# Patient Record
Sex: MALE | Race: WHITE | NOT HISPANIC OR LATINO | Employment: OTHER | URBAN - METROPOLITAN AREA
[De-identification: names, ages, dates, MRNs, and addresses within clinical notes are randomized per-mention and may not be internally consistent; named-entity substitution may affect disease eponyms.]

---

## 2017-08-05 ENCOUNTER — COMMUNICATION - HEALTHEAST (OUTPATIENT)
Dept: FAMILY MEDICINE | Facility: CLINIC | Age: 72
End: 2017-08-05

## 2017-08-05 DIAGNOSIS — I10 ESSENTIAL HYPERTENSION, BENIGN: ICD-10-CM

## 2017-08-07 ENCOUNTER — COMMUNICATION - HEALTHEAST (OUTPATIENT)
Dept: FAMILY MEDICINE | Facility: CLINIC | Age: 72
End: 2017-08-07

## 2017-08-11 ENCOUNTER — OFFICE VISIT - HEALTHEAST (OUTPATIENT)
Dept: FAMILY MEDICINE | Facility: CLINIC | Age: 72
End: 2017-08-11

## 2017-08-11 DIAGNOSIS — E11.9 DIABETES MELLITUS TYPE 2, CONTROLLED (H): ICD-10-CM

## 2017-08-11 DIAGNOSIS — B35.3 TINEA PEDIS: ICD-10-CM

## 2017-08-11 DIAGNOSIS — Z00.00 ROUTINE GENERAL MEDICAL EXAMINATION AT A HEALTH CARE FACILITY: ICD-10-CM

## 2017-08-11 DIAGNOSIS — E55.9 VITAMIN D DEFICIENCY: ICD-10-CM

## 2017-08-11 DIAGNOSIS — E78.00 HYPERCHOLESTEREMIA: ICD-10-CM

## 2017-08-11 LAB
CHOLEST SERPL-MCNC: 131 MG/DL
FASTING STATUS PATIENT QL REPORTED: YES
HBA1C MFR BLD: 7.6 % (ref 3.5–6)
HDLC SERPL-MCNC: 42 MG/DL
LDLC SERPL CALC-MCNC: 73 MG/DL
TRIGL SERPL-MCNC: 79 MG/DL

## 2017-08-11 ASSESSMENT — MIFFLIN-ST. JEOR: SCORE: 1444.07

## 2017-08-15 ENCOUNTER — COMMUNICATION - HEALTHEAST (OUTPATIENT)
Dept: FAMILY MEDICINE | Facility: CLINIC | Age: 72
End: 2017-08-15

## 2017-09-12 ENCOUNTER — COMMUNICATION - HEALTHEAST (OUTPATIENT)
Dept: FAMILY MEDICINE | Facility: CLINIC | Age: 72
End: 2017-09-12

## 2017-09-12 DIAGNOSIS — E11.9 TYPE 2 DIABETES MELLITUS (H): ICD-10-CM

## 2017-09-13 ENCOUNTER — RECORDS - HEALTHEAST (OUTPATIENT)
Dept: ADMINISTRATIVE | Facility: OTHER | Age: 72
End: 2017-09-13

## 2017-09-21 ENCOUNTER — RECORDS - HEALTHEAST (OUTPATIENT)
Dept: ADMINISTRATIVE | Facility: OTHER | Age: 72
End: 2017-09-21

## 2017-10-12 ENCOUNTER — COMMUNICATION - HEALTHEAST (OUTPATIENT)
Dept: FAMILY MEDICINE | Facility: CLINIC | Age: 72
End: 2017-10-12

## 2017-10-21 ENCOUNTER — COMMUNICATION - HEALTHEAST (OUTPATIENT)
Dept: FAMILY MEDICINE | Facility: CLINIC | Age: 72
End: 2017-10-21

## 2017-10-21 DIAGNOSIS — B35.3 TINEA PEDIS: ICD-10-CM

## 2017-10-21 DIAGNOSIS — I10 ESSENTIAL HYPERTENSION, BENIGN: ICD-10-CM

## 2017-10-23 ENCOUNTER — COMMUNICATION - HEALTHEAST (OUTPATIENT)
Dept: FAMILY MEDICINE | Facility: CLINIC | Age: 72
End: 2017-10-23

## 2017-12-30 ENCOUNTER — COMMUNICATION - HEALTHEAST (OUTPATIENT)
Dept: FAMILY MEDICINE | Facility: CLINIC | Age: 72
End: 2017-12-30

## 2017-12-30 DIAGNOSIS — I10 ESSENTIAL HYPERTENSION, BENIGN: ICD-10-CM

## 2018-03-31 ENCOUNTER — COMMUNICATION - HEALTHEAST (OUTPATIENT)
Dept: FAMILY MEDICINE | Facility: CLINIC | Age: 73
End: 2018-03-31

## 2018-03-31 DIAGNOSIS — E11.9 TYPE 2 DIABETES MELLITUS (H): ICD-10-CM

## 2018-05-03 ENCOUNTER — RECORDS - HEALTHEAST (OUTPATIENT)
Dept: ADMINISTRATIVE | Facility: OTHER | Age: 73
End: 2018-05-03

## 2018-05-08 ENCOUNTER — COMMUNICATION - HEALTHEAST (OUTPATIENT)
Dept: FAMILY MEDICINE | Facility: CLINIC | Age: 73
End: 2018-05-08

## 2018-05-08 DIAGNOSIS — E11.9 TYPE 2 DIABETES MELLITUS (H): ICD-10-CM

## 2018-05-08 DIAGNOSIS — I10 ESSENTIAL HYPERTENSION, BENIGN: ICD-10-CM

## 2018-08-04 ENCOUNTER — COMMUNICATION - HEALTHEAST (OUTPATIENT)
Dept: FAMILY MEDICINE | Facility: CLINIC | Age: 73
End: 2018-08-04

## 2018-08-04 DIAGNOSIS — B35.3 TINEA PEDIS: ICD-10-CM

## 2018-08-06 ENCOUNTER — COMMUNICATION - HEALTHEAST (OUTPATIENT)
Dept: FAMILY MEDICINE | Facility: CLINIC | Age: 73
End: 2018-08-06

## 2018-08-06 DIAGNOSIS — I10 ESSENTIAL HYPERTENSION, BENIGN: ICD-10-CM

## 2018-08-13 ENCOUNTER — AMBULATORY - HEALTHEAST (OUTPATIENT)
Dept: FAMILY MEDICINE | Facility: CLINIC | Age: 73
End: 2018-08-13

## 2018-08-13 ENCOUNTER — OFFICE VISIT - HEALTHEAST (OUTPATIENT)
Dept: FAMILY MEDICINE | Facility: CLINIC | Age: 73
End: 2018-08-13

## 2018-08-13 DIAGNOSIS — D12.6 BENIGN NEOPLASM OF COLON: ICD-10-CM

## 2018-08-13 DIAGNOSIS — E78.00 HYPERCHOLESTEREMIA: ICD-10-CM

## 2018-08-13 DIAGNOSIS — E11.9 DIABETES MELLITUS TYPE 2, CONTROLLED (H): ICD-10-CM

## 2018-08-13 DIAGNOSIS — B35.3 TINEA PEDIS OF BOTH FEET: ICD-10-CM

## 2018-08-13 DIAGNOSIS — E55.9 VITAMIN D DEFICIENCY: ICD-10-CM

## 2018-08-13 LAB
ALBUMIN SERPL-MCNC: 4.3 G/DL (ref 3.5–5)
ALP SERPL-CCNC: 53 U/L (ref 45–120)
ALT SERPL W P-5'-P-CCNC: 24 U/L (ref 0–45)
ANION GAP SERPL CALCULATED.3IONS-SCNC: 13 MMOL/L (ref 5–18)
AST SERPL W P-5'-P-CCNC: 21 U/L (ref 0–40)
BILIRUB SERPL-MCNC: 1.2 MG/DL (ref 0–1)
BUN SERPL-MCNC: 15 MG/DL (ref 8–28)
CALCIUM SERPL-MCNC: 9.8 MG/DL (ref 8.5–10.5)
CHLORIDE BLD-SCNC: 104 MMOL/L (ref 98–107)
CHOLEST SERPL-MCNC: 131 MG/DL
CO2 SERPL-SCNC: 25 MMOL/L (ref 22–31)
CREAT SERPL-MCNC: 0.97 MG/DL (ref 0.7–1.3)
CREAT UR-MCNC: 96.7 MG/DL
FASTING STATUS PATIENT QL REPORTED: YES
GFR SERPL CREATININE-BSD FRML MDRD: >60 ML/MIN/1.73M2
GLUCOSE BLD-MCNC: 96 MG/DL (ref 70–125)
HBA1C MFR BLD: 7.2 % (ref 3.5–6)
HDLC SERPL-MCNC: 42 MG/DL
LDLC SERPL CALC-MCNC: 77 MG/DL
MICROALBUMIN UR-MCNC: 0.79 MG/DL (ref 0–1.99)
MICROALBUMIN/CREAT UR: 8.2 MG/G
POTASSIUM BLD-SCNC: 4.6 MMOL/L (ref 3.5–5)
PROT SERPL-MCNC: 6.7 G/DL (ref 6–8)
SODIUM SERPL-SCNC: 142 MMOL/L (ref 136–145)
TRIGL SERPL-MCNC: 59 MG/DL

## 2018-08-13 ASSESSMENT — MIFFLIN-ST. JEOR: SCORE: 1425.82

## 2018-08-14 ENCOUNTER — COMMUNICATION - HEALTHEAST (OUTPATIENT)
Dept: FAMILY MEDICINE | Facility: CLINIC | Age: 73
End: 2018-08-14

## 2018-08-14 LAB — 25(OH)D3 SERPL-MCNC: 50.8 NG/ML (ref 30–80)

## 2018-09-23 ENCOUNTER — COMMUNICATION - HEALTHEAST (OUTPATIENT)
Dept: FAMILY MEDICINE | Facility: CLINIC | Age: 73
End: 2018-09-23

## 2018-09-23 DIAGNOSIS — B35.3 TINEA PEDIS: ICD-10-CM

## 2018-09-24 ENCOUNTER — COMMUNICATION - HEALTHEAST (OUTPATIENT)
Dept: FAMILY MEDICINE | Facility: CLINIC | Age: 73
End: 2018-09-24

## 2018-09-24 DIAGNOSIS — E11.9 TYPE 2 DIABETES MELLITUS (H): ICD-10-CM

## 2018-09-24 RX ORDER — ERGOCALCIFEROL 1.25 MG/1
CAPSULE ORAL
Qty: 6 CAPSULE | Refills: 3 | Status: SHIPPED | OUTPATIENT
Start: 2018-09-24 | End: 2022-04-26

## 2018-09-26 ENCOUNTER — RECORDS - HEALTHEAST (OUTPATIENT)
Dept: ADMINISTRATIVE | Facility: OTHER | Age: 73
End: 2018-09-26

## 2018-10-04 ENCOUNTER — RECORDS - HEALTHEAST (OUTPATIENT)
Dept: ADMINISTRATIVE | Facility: OTHER | Age: 73
End: 2018-10-04

## 2018-10-23 ENCOUNTER — COMMUNICATION - HEALTHEAST (OUTPATIENT)
Dept: FAMILY MEDICINE | Facility: CLINIC | Age: 73
End: 2018-10-23

## 2018-10-23 DIAGNOSIS — I10 ESSENTIAL HYPERTENSION, BENIGN: ICD-10-CM

## 2018-11-02 ENCOUNTER — RECORDS - HEALTHEAST (OUTPATIENT)
Dept: ADMINISTRATIVE | Facility: OTHER | Age: 73
End: 2018-11-02

## 2018-12-14 ENCOUNTER — COMMUNICATION - HEALTHEAST (OUTPATIENT)
Dept: FAMILY MEDICINE | Facility: CLINIC | Age: 73
End: 2018-12-14

## 2018-12-14 DIAGNOSIS — I10 ESSENTIAL HYPERTENSION, BENIGN: ICD-10-CM

## 2019-04-24 ENCOUNTER — COMMUNICATION - HEALTHEAST (OUTPATIENT)
Dept: FAMILY MEDICINE | Facility: CLINIC | Age: 74
End: 2019-04-24

## 2019-04-24 DIAGNOSIS — I10 ESSENTIAL HYPERTENSION, BENIGN: ICD-10-CM

## 2019-04-24 DIAGNOSIS — E11.9 TYPE 2 DIABETES MELLITUS (H): ICD-10-CM

## 2019-05-23 ENCOUNTER — RECORDS - HEALTHEAST (OUTPATIENT)
Dept: ADMINISTRATIVE | Facility: OTHER | Age: 74
End: 2019-05-23

## 2019-05-28 ENCOUNTER — RECORDS - HEALTHEAST (OUTPATIENT)
Dept: HEALTH INFORMATION MANAGEMENT | Facility: CLINIC | Age: 74
End: 2019-05-28

## 2019-07-30 ENCOUNTER — COMMUNICATION - HEALTHEAST (OUTPATIENT)
Dept: FAMILY MEDICINE | Facility: CLINIC | Age: 74
End: 2019-07-30

## 2019-07-30 DIAGNOSIS — I10 ESSENTIAL HYPERTENSION, BENIGN: ICD-10-CM

## 2019-07-31 ENCOUNTER — COMMUNICATION - HEALTHEAST (OUTPATIENT)
Dept: FAMILY MEDICINE | Facility: CLINIC | Age: 74
End: 2019-07-31

## 2019-07-31 DIAGNOSIS — I10 ESSENTIAL HYPERTENSION, BENIGN: ICD-10-CM

## 2019-09-04 ENCOUNTER — OFFICE VISIT - HEALTHEAST (OUTPATIENT)
Dept: FAMILY MEDICINE | Facility: CLINIC | Age: 74
End: 2019-09-04

## 2019-09-04 DIAGNOSIS — Z00.00 ROUTINE GENERAL MEDICAL EXAMINATION AT A HEALTH CARE FACILITY: ICD-10-CM

## 2019-09-04 DIAGNOSIS — E78.00 HYPERCHOLESTEREMIA: ICD-10-CM

## 2019-09-04 DIAGNOSIS — D12.6 ADENOMATOUS POLYP OF COLON, UNSPECIFIED PART OF COLON: ICD-10-CM

## 2019-09-04 DIAGNOSIS — I10 ESSENTIAL HYPERTENSION, BENIGN: ICD-10-CM

## 2019-09-04 DIAGNOSIS — B35.3 TINEA PEDIS OF BOTH FEET: ICD-10-CM

## 2019-09-04 DIAGNOSIS — R97.20 ELEVATED PROSTATE SPECIFIC ANTIGEN (PSA): ICD-10-CM

## 2019-09-04 DIAGNOSIS — E11.9 CONTROLLED TYPE 2 DIABETES MELLITUS WITHOUT COMPLICATION, WITHOUT LONG-TERM CURRENT USE OF INSULIN (H): ICD-10-CM

## 2019-09-04 DIAGNOSIS — E55.9 VITAMIN D DEFICIENCY: ICD-10-CM

## 2019-09-04 LAB
ALBUMIN SERPL-MCNC: 4.5 G/DL (ref 3.5–5)
ALP SERPL-CCNC: 58 U/L (ref 45–120)
ALT SERPL W P-5'-P-CCNC: 20 U/L (ref 0–45)
ANION GAP SERPL CALCULATED.3IONS-SCNC: 10 MMOL/L (ref 5–18)
AST SERPL W P-5'-P-CCNC: 18 U/L (ref 0–40)
BILIRUB SERPL-MCNC: 1.6 MG/DL (ref 0–1)
BUN SERPL-MCNC: 11 MG/DL (ref 8–28)
CALCIUM SERPL-MCNC: 9.7 MG/DL (ref 8.5–10.5)
CHLORIDE BLD-SCNC: 103 MMOL/L (ref 98–107)
CHOLEST SERPL-MCNC: 147 MG/DL
CO2 SERPL-SCNC: 28 MMOL/L (ref 22–31)
CREAT SERPL-MCNC: 0.97 MG/DL (ref 0.7–1.3)
CREAT UR-MCNC: 93.9 MG/DL
FASTING STATUS PATIENT QL REPORTED: YES
GFR SERPL CREATININE-BSD FRML MDRD: >60 ML/MIN/1.73M2
GLUCOSE BLD-MCNC: 149 MG/DL (ref 70–125)
HBA1C MFR BLD: 7.2 % (ref 3.5–6)
HDLC SERPL-MCNC: 52 MG/DL
LDLC SERPL CALC-MCNC: 76 MG/DL
MAGNESIUM SERPL-MCNC: 2.1 MG/DL (ref 1.8–2.6)
MICROALBUMIN UR-MCNC: 1.17 MG/DL (ref 0–1.99)
MICROALBUMIN/CREAT UR: 12.5 MG/G
POTASSIUM BLD-SCNC: 4.5 MMOL/L (ref 3.5–5)
PROT SERPL-MCNC: 7 G/DL (ref 6–8)
SODIUM SERPL-SCNC: 141 MMOL/L (ref 136–145)
TRIGL SERPL-MCNC: 93 MG/DL

## 2019-09-04 ASSESSMENT — MIFFLIN-ST. JEOR: SCORE: 1426.39

## 2019-09-05 LAB
25(OH)D3 SERPL-MCNC: 38.9 NG/ML (ref 30–80)
25(OH)D3 SERPL-MCNC: 38.9 NG/ML (ref 30–80)

## 2019-09-24 ENCOUNTER — COMMUNICATION - HEALTHEAST (OUTPATIENT)
Dept: FAMILY MEDICINE | Facility: CLINIC | Age: 74
End: 2019-09-24

## 2019-09-24 DIAGNOSIS — E11.9 TYPE 2 DIABETES MELLITUS (H): ICD-10-CM

## 2019-09-27 ENCOUNTER — COMMUNICATION - HEALTHEAST (OUTPATIENT)
Dept: FAMILY MEDICINE | Facility: CLINIC | Age: 74
End: 2019-09-27

## 2019-09-27 DIAGNOSIS — I10 ESSENTIAL HYPERTENSION, BENIGN: ICD-10-CM

## 2019-10-07 ENCOUNTER — COMMUNICATION - HEALTHEAST (OUTPATIENT)
Dept: FAMILY MEDICINE | Facility: CLINIC | Age: 74
End: 2019-10-07

## 2019-10-07 DIAGNOSIS — E55.9 VITAMIN D DEFICIENCY: ICD-10-CM

## 2019-10-08 RX ORDER — ERGOCALCIFEROL 1.25 MG/1
CAPSULE ORAL
Qty: 6 CAPSULE | Refills: 3 | Status: SHIPPED | OUTPATIENT
Start: 2019-10-08 | End: 2022-04-26

## 2019-10-24 ENCOUNTER — COMMUNICATION - HEALTHEAST (OUTPATIENT)
Dept: FAMILY MEDICINE | Facility: CLINIC | Age: 74
End: 2019-10-24

## 2019-10-24 DIAGNOSIS — I10 ESSENTIAL HYPERTENSION, BENIGN: ICD-10-CM

## 2019-11-01 ENCOUNTER — AMBULATORY - HEALTHEAST (OUTPATIENT)
Dept: OTHER | Facility: CLINIC | Age: 74
End: 2019-11-01

## 2019-11-15 ENCOUNTER — AMBULATORY - HEALTHEAST (OUTPATIENT)
Dept: OTHER | Facility: CLINIC | Age: 74
End: 2019-11-15

## 2019-11-21 ENCOUNTER — RECORDS - HEALTHEAST (OUTPATIENT)
Dept: ADMINISTRATIVE | Facility: OTHER | Age: 74
End: 2019-11-21

## 2020-03-22 ENCOUNTER — COMMUNICATION - HEALTHEAST (OUTPATIENT)
Dept: SCHEDULING | Facility: CLINIC | Age: 75
End: 2020-03-22

## 2020-06-03 ENCOUNTER — COMMUNICATION - HEALTHEAST (OUTPATIENT)
Dept: FAMILY MEDICINE | Facility: CLINIC | Age: 75
End: 2020-06-03

## 2020-06-03 DIAGNOSIS — B35.3 TINEA PEDIS: ICD-10-CM

## 2020-06-04 RX ORDER — CLOTRIMAZOLE AND BETAMETHASONE DIPROPIONATE 10; .64 MG/G; MG/G
CREAM TOPICAL
Qty: 30 G | Refills: 0 | Status: SHIPPED | OUTPATIENT
Start: 2020-06-04 | End: 2023-03-31

## 2020-06-15 ENCOUNTER — COMMUNICATION - HEALTHEAST (OUTPATIENT)
Dept: FAMILY MEDICINE | Facility: CLINIC | Age: 75
End: 2020-06-15

## 2020-06-15 DIAGNOSIS — E11.9 TYPE 2 DIABETES MELLITUS (H): ICD-10-CM

## 2020-07-22 ENCOUNTER — OFFICE VISIT - HEALTHEAST (OUTPATIENT)
Dept: FAMILY MEDICINE | Facility: CLINIC | Age: 75
End: 2020-07-22

## 2020-07-22 DIAGNOSIS — E11.9 CONTROLLED TYPE 2 DIABETES MELLITUS WITHOUT COMPLICATION, WITHOUT LONG-TERM CURRENT USE OF INSULIN (H): ICD-10-CM

## 2020-07-22 DIAGNOSIS — R10.13 EPIGASTRIC PAIN: ICD-10-CM

## 2020-07-22 DIAGNOSIS — E55.9 VITAMIN D DEFICIENCY: ICD-10-CM

## 2020-07-27 ENCOUNTER — AMBULATORY - HEALTHEAST (OUTPATIENT)
Dept: LAB | Facility: CLINIC | Age: 75
End: 2020-07-27

## 2020-07-27 DIAGNOSIS — E11.9 CONTROLLED TYPE 2 DIABETES MELLITUS WITHOUT COMPLICATION, WITHOUT LONG-TERM CURRENT USE OF INSULIN (H): ICD-10-CM

## 2020-07-27 DIAGNOSIS — E55.9 VITAMIN D DEFICIENCY: ICD-10-CM

## 2020-07-27 DIAGNOSIS — R10.13 EPIGASTRIC PAIN: ICD-10-CM

## 2020-07-27 LAB
ALBUMIN SERPL-MCNC: 4.4 G/DL (ref 3.5–5)
ALP SERPL-CCNC: 52 U/L (ref 45–120)
ALT SERPL W P-5'-P-CCNC: 24 U/L (ref 0–45)
ANION GAP SERPL CALCULATED.3IONS-SCNC: 9 MMOL/L (ref 5–18)
AST SERPL W P-5'-P-CCNC: 18 U/L (ref 0–40)
BASOPHILS # BLD AUTO: 0 THOU/UL (ref 0–0.2)
BASOPHILS NFR BLD AUTO: 1 % (ref 0–2)
BILIRUB SERPL-MCNC: 1.3 MG/DL (ref 0–1)
BUN SERPL-MCNC: 13 MG/DL (ref 8–28)
CALCIUM SERPL-MCNC: 9.6 MG/DL (ref 8.5–10.5)
CHLORIDE BLD-SCNC: 102 MMOL/L (ref 98–107)
CHOLEST SERPL-MCNC: 144 MG/DL
CO2 SERPL-SCNC: 28 MMOL/L (ref 22–31)
CREAT SERPL-MCNC: 0.98 MG/DL (ref 0.7–1.3)
CREAT UR-MCNC: 139.8 MG/DL
EOSINOPHIL # BLD AUTO: 0.6 THOU/UL (ref 0–0.4)
EOSINOPHIL NFR BLD AUTO: 9 % (ref 0–6)
ERYTHROCYTE [DISTWIDTH] IN BLOOD BY AUTOMATED COUNT: 11.7 % (ref 11–14.5)
FASTING STATUS PATIENT QL REPORTED: YES
GFR SERPL CREATININE-BSD FRML MDRD: >60 ML/MIN/1.73M2
GLUCOSE BLD-MCNC: 144 MG/DL (ref 70–125)
HBA1C MFR BLD: 6.4 % (ref 3.5–6)
HCT VFR BLD AUTO: 41.2 % (ref 40–54)
HDLC SERPL-MCNC: 53 MG/DL
HGB BLD-MCNC: 14.1 G/DL (ref 14–18)
LDLC SERPL CALC-MCNC: 73 MG/DL
LIPASE SERPL-CCNC: 32 U/L (ref 0–52)
LYMPHOCYTES # BLD AUTO: 1.4 THOU/UL (ref 0.8–4.4)
LYMPHOCYTES NFR BLD AUTO: 20 % (ref 20–40)
MCH RBC QN AUTO: 31.8 PG (ref 27–34)
MCHC RBC AUTO-ENTMCNC: 34.1 G/DL (ref 32–36)
MCV RBC AUTO: 93 FL (ref 80–100)
MICROALBUMIN UR-MCNC: 1.24 MG/DL (ref 0–1.99)
MICROALBUMIN/CREAT UR: 8.9 MG/G
MONOCYTES # BLD AUTO: 0.5 THOU/UL (ref 0–0.9)
MONOCYTES NFR BLD AUTO: 7 % (ref 2–10)
NEUTROPHILS # BLD AUTO: 4.4 THOU/UL (ref 2–7.7)
NEUTROPHILS NFR BLD AUTO: 65 % (ref 50–70)
PLATELET # BLD AUTO: 166 THOU/UL (ref 140–440)
PMV BLD AUTO: 8.2 FL (ref 7–10)
POTASSIUM BLD-SCNC: 4.5 MMOL/L (ref 3.5–5)
PROT SERPL-MCNC: 6.6 G/DL (ref 6–8)
RBC # BLD AUTO: 4.42 MILL/UL (ref 4.4–6.2)
SODIUM SERPL-SCNC: 139 MMOL/L (ref 136–145)
TRIGL SERPL-MCNC: 88 MG/DL
WBC: 6.9 THOU/UL (ref 4–11)

## 2020-07-28 ENCOUNTER — COMMUNICATION - HEALTHEAST (OUTPATIENT)
Dept: FAMILY MEDICINE | Facility: CLINIC | Age: 75
End: 2020-07-28

## 2020-07-28 LAB — 25(OH)D3 SERPL-MCNC: 51.1 NG/ML (ref 30–80)

## 2020-07-29 ENCOUNTER — AMBULATORY - HEALTHEAST (OUTPATIENT)
Dept: FAMILY MEDICINE | Facility: CLINIC | Age: 75
End: 2020-07-29

## 2020-07-29 DIAGNOSIS — D72.10 EOSINOPHILIA: ICD-10-CM

## 2020-08-26 ENCOUNTER — COMMUNICATION - HEALTHEAST (OUTPATIENT)
Dept: LAB | Facility: CLINIC | Age: 75
End: 2020-08-26

## 2020-08-26 DIAGNOSIS — D72.10 EOSINOPHILIA: ICD-10-CM

## 2020-09-05 ENCOUNTER — COMMUNICATION - HEALTHEAST (OUTPATIENT)
Dept: FAMILY MEDICINE | Facility: CLINIC | Age: 75
End: 2020-09-05

## 2020-09-05 DIAGNOSIS — E55.9 VITAMIN D DEFICIENCY: ICD-10-CM

## 2020-09-06 RX ORDER — ERGOCALCIFEROL 1.25 MG/1
CAPSULE ORAL
Qty: 6 CAPSULE | Refills: 3 | Status: SHIPPED | OUTPATIENT
Start: 2020-09-06 | End: 2023-03-31

## 2020-09-09 ENCOUNTER — COMMUNICATION - HEALTHEAST (OUTPATIENT)
Dept: FAMILY MEDICINE | Facility: CLINIC | Age: 75
End: 2020-09-09

## 2020-09-09 DIAGNOSIS — I10 ESSENTIAL HYPERTENSION, BENIGN: ICD-10-CM

## 2020-09-10 RX ORDER — AMLODIPINE BESYLATE 2.5 MG/1
TABLET ORAL
Qty: 90 TABLET | Refills: 3 | Status: SHIPPED | OUTPATIENT
Start: 2020-09-10 | End: 2021-09-10

## 2020-09-10 RX ORDER — BENAZEPRIL HYDROCHLORIDE 10 MG/1
TABLET ORAL
Qty: 90 TABLET | Refills: 3 | Status: SHIPPED | OUTPATIENT
Start: 2020-09-10 | End: 2021-09-13

## 2020-09-21 ENCOUNTER — OFFICE VISIT - HEALTHEAST (OUTPATIENT)
Dept: FAMILY MEDICINE | Facility: CLINIC | Age: 75
End: 2020-09-21

## 2020-09-21 DIAGNOSIS — H60.503 ACUTE OTITIS EXTERNA OF BOTH EARS, UNSPECIFIED TYPE: ICD-10-CM

## 2020-09-21 DIAGNOSIS — Z23 NEED FOR INFLUENZA VACCINATION: ICD-10-CM

## 2020-09-21 DIAGNOSIS — H61.23 BILATERAL IMPACTED CERUMEN: ICD-10-CM

## 2020-09-22 ENCOUNTER — AMBULATORY - HEALTHEAST (OUTPATIENT)
Dept: FAMILY MEDICINE | Facility: CLINIC | Age: 75
End: 2020-09-22

## 2020-09-22 DIAGNOSIS — H90.3 BILATERAL SENSORINEURAL HEARING LOSS: ICD-10-CM

## 2020-09-23 ENCOUNTER — AMBULATORY - HEALTHEAST (OUTPATIENT)
Dept: LAB | Facility: CLINIC | Age: 75
End: 2020-09-23

## 2020-09-23 DIAGNOSIS — D72.10 EOSINOPHILIA: ICD-10-CM

## 2020-09-23 LAB
BASOPHILS # BLD AUTO: 0 THOU/UL (ref 0–0.2)
BASOPHILS NFR BLD AUTO: 1 % (ref 0–2)
EOSINOPHIL # BLD AUTO: 0.5 THOU/UL (ref 0–0.4)
EOSINOPHIL NFR BLD AUTO: 7 % (ref 0–6)
ERYTHROCYTE [DISTWIDTH] IN BLOOD BY AUTOMATED COUNT: 11.5 % (ref 11–14.5)
HCT VFR BLD AUTO: 41.4 % (ref 40–54)
HGB BLD-MCNC: 14 G/DL (ref 14–18)
LYMPHOCYTES # BLD AUTO: 1.5 THOU/UL (ref 0.8–4.4)
LYMPHOCYTES NFR BLD AUTO: 22 % (ref 20–40)
MCH RBC QN AUTO: 31.1 PG (ref 27–34)
MCHC RBC AUTO-ENTMCNC: 33.7 G/DL (ref 32–36)
MCV RBC AUTO: 92 FL (ref 80–100)
MONOCYTES # BLD AUTO: 0.6 THOU/UL (ref 0–0.9)
MONOCYTES NFR BLD AUTO: 8 % (ref 2–10)
NEUTROPHILS # BLD AUTO: 4.4 THOU/UL (ref 2–7.7)
NEUTROPHILS NFR BLD AUTO: 63 % (ref 50–70)
PLATELET # BLD AUTO: 222 THOU/UL (ref 140–440)
PMV BLD AUTO: 8.2 FL (ref 7–10)
RBC # BLD AUTO: 4.48 MILL/UL (ref 4.4–6.2)
WBC: 7 THOU/UL (ref 4–11)

## 2020-09-25 ENCOUNTER — COMMUNICATION - HEALTHEAST (OUTPATIENT)
Dept: FAMILY MEDICINE | Facility: CLINIC | Age: 75
End: 2020-09-25

## 2020-09-25 ENCOUNTER — COMMUNICATION - HEALTHEAST (OUTPATIENT)
Dept: SCHEDULING | Facility: CLINIC | Age: 75
End: 2020-09-25

## 2020-09-25 DIAGNOSIS — H60.503 ACUTE OTITIS EXTERNA OF BOTH EARS, UNSPECIFIED TYPE: ICD-10-CM

## 2020-10-01 ENCOUNTER — OFFICE VISIT - HEALTHEAST (OUTPATIENT)
Dept: AUDIOLOGY | Facility: CLINIC | Age: 75
End: 2020-10-01

## 2020-10-01 ENCOUNTER — OFFICE VISIT - HEALTHEAST (OUTPATIENT)
Dept: OTOLARYNGOLOGY | Facility: CLINIC | Age: 75
End: 2020-10-01

## 2020-10-01 DIAGNOSIS — H60.393 INFECTIVE OTITIS EXTERNA, BILATERAL: ICD-10-CM

## 2020-10-01 DIAGNOSIS — H90.3 SENSORINEURAL HEARING LOSS (SNHL) OF BOTH EARS: ICD-10-CM

## 2020-10-01 DIAGNOSIS — H61.23 BILATERAL IMPACTED CERUMEN: ICD-10-CM

## 2020-10-01 DIAGNOSIS — H93.13 TINNITUS OF BOTH EARS: ICD-10-CM

## 2020-10-07 ENCOUNTER — COMMUNICATION - HEALTHEAST (OUTPATIENT)
Dept: OTOLARYNGOLOGY | Facility: CLINIC | Age: 75
End: 2020-10-07

## 2020-10-07 DIAGNOSIS — H60.503 ACUTE OTITIS EXTERNA OF BOTH EARS, UNSPECIFIED TYPE: ICD-10-CM

## 2020-10-07 RX ORDER — CIPROFLOXACIN AND DEXAMETHASONE 3; 1 MG/ML; MG/ML
SUSPENSION/ DROPS AURICULAR (OTIC)
Qty: 7.5 ML | Refills: 0 | Status: SHIPPED | OUTPATIENT
Start: 2020-10-07 | End: 2022-04-26

## 2020-10-09 ENCOUNTER — OFFICE VISIT - HEALTHEAST (OUTPATIENT)
Dept: OTOLARYNGOLOGY | Facility: CLINIC | Age: 75
End: 2020-10-09

## 2020-10-09 DIAGNOSIS — L29.9 ITCHING OF EAR: ICD-10-CM

## 2020-10-09 DIAGNOSIS — H60.393 INFECTIVE OTITIS EXTERNA, BILATERAL: ICD-10-CM

## 2020-10-09 RX ORDER — FLUOCINONIDE 0.5 MG/G
CREAM TOPICAL
Qty: 30 G | Refills: 1 | Status: SHIPPED | OUTPATIENT
Start: 2020-10-09 | End: 2023-03-31

## 2020-12-07 ENCOUNTER — COMMUNICATION - HEALTHEAST (OUTPATIENT)
Dept: FAMILY MEDICINE | Facility: CLINIC | Age: 75
End: 2020-12-07

## 2020-12-07 DIAGNOSIS — E11.9 TYPE 2 DIABETES MELLITUS (H): ICD-10-CM

## 2020-12-16 ENCOUNTER — COMMUNICATION - HEALTHEAST (OUTPATIENT)
Dept: FAMILY MEDICINE | Facility: CLINIC | Age: 75
End: 2020-12-16

## 2020-12-17 ENCOUNTER — COMMUNICATION - HEALTHEAST (OUTPATIENT)
Dept: FAMILY MEDICINE | Facility: CLINIC | Age: 75
End: 2020-12-17

## 2020-12-17 DIAGNOSIS — I10 ESSENTIAL HYPERTENSION, BENIGN: ICD-10-CM

## 2020-12-18 RX ORDER — ATORVASTATIN CALCIUM 10 MG/1
TABLET, FILM COATED ORAL
Qty: 90 TABLET | Refills: 2 | Status: SHIPPED | OUTPATIENT
Start: 2020-12-18 | End: 2021-09-10

## 2021-01-14 ENCOUNTER — COMMUNICATION - HEALTHEAST (OUTPATIENT)
Dept: FAMILY MEDICINE | Facility: CLINIC | Age: 76
End: 2021-01-14

## 2021-02-18 ENCOUNTER — RECORDS - HEALTHEAST (OUTPATIENT)
Dept: ADMINISTRATIVE | Facility: OTHER | Age: 76
End: 2021-02-18

## 2021-02-22 ENCOUNTER — COMMUNICATION - HEALTHEAST (OUTPATIENT)
Dept: FAMILY MEDICINE | Facility: CLINIC | Age: 76
End: 2021-02-22

## 2021-03-05 ENCOUNTER — COMMUNICATION - HEALTHEAST (OUTPATIENT)
Dept: FAMILY MEDICINE | Facility: CLINIC | Age: 76
End: 2021-03-05

## 2021-03-05 DIAGNOSIS — E11.9 TYPE 2 DIABETES MELLITUS (H): ICD-10-CM

## 2021-03-12 ENCOUNTER — RECORDS - HEALTHEAST (OUTPATIENT)
Dept: ADMINISTRATIVE | Facility: OTHER | Age: 76
End: 2021-03-12

## 2021-03-12 LAB — RETINOPATHY: NEGATIVE

## 2021-03-19 ENCOUNTER — RECORDS - HEALTHEAST (OUTPATIENT)
Dept: HEALTH INFORMATION MANAGEMENT | Facility: CLINIC | Age: 76
End: 2021-03-19

## 2021-03-22 ENCOUNTER — OFFICE VISIT - HEALTHEAST (OUTPATIENT)
Dept: FAMILY MEDICINE | Facility: CLINIC | Age: 76
End: 2021-03-22

## 2021-03-22 DIAGNOSIS — Z00.00 ROUTINE GENERAL MEDICAL EXAMINATION AT A HEALTH CARE FACILITY: ICD-10-CM

## 2021-03-22 DIAGNOSIS — Z11.3 SCREEN FOR STD (SEXUALLY TRANSMITTED DISEASE): ICD-10-CM

## 2021-03-22 DIAGNOSIS — E11.9 CONTROLLED TYPE 2 DIABETES MELLITUS WITHOUT COMPLICATION, WITHOUT LONG-TERM CURRENT USE OF INSULIN (H): ICD-10-CM

## 2021-03-22 DIAGNOSIS — E55.9 VITAMIN D DEFICIENCY: ICD-10-CM

## 2021-03-22 LAB
ALBUMIN SERPL-MCNC: 4.5 G/DL (ref 3.5–5)
ALP SERPL-CCNC: 73 U/L (ref 45–120)
ALT SERPL W P-5'-P-CCNC: 25 U/L (ref 0–45)
ANION GAP SERPL CALCULATED.3IONS-SCNC: 12 MMOL/L (ref 5–18)
AST SERPL W P-5'-P-CCNC: 18 U/L (ref 0–40)
BASOPHILS # BLD AUTO: 0 THOU/UL (ref 0–0.2)
BASOPHILS NFR BLD AUTO: 1 % (ref 0–2)
BILIRUB SERPL-MCNC: 2 MG/DL (ref 0–1)
BUN SERPL-MCNC: 14 MG/DL (ref 8–28)
CALCIUM SERPL-MCNC: 9.5 MG/DL (ref 8.5–10.5)
CHLORIDE BLD-SCNC: 104 MMOL/L (ref 98–107)
CHOLEST SERPL-MCNC: 144 MG/DL
CO2 SERPL-SCNC: 26 MMOL/L (ref 22–31)
CREAT SERPL-MCNC: 0.93 MG/DL (ref 0.7–1.3)
CREAT UR-MCNC: 71.6 MG/DL
EOSINOPHIL # BLD AUTO: 0.4 THOU/UL (ref 0–0.4)
EOSINOPHIL NFR BLD AUTO: 6 % (ref 0–6)
ERYTHROCYTE [DISTWIDTH] IN BLOOD BY AUTOMATED COUNT: 12.5 % (ref 11–14.5)
FASTING STATUS PATIENT QL REPORTED: YES
GFR SERPL CREATININE-BSD FRML MDRD: >60 ML/MIN/1.73M2
GLUCOSE BLD-MCNC: 143 MG/DL (ref 70–125)
HBA1C MFR BLD: 6.9 %
HCT VFR BLD AUTO: 42.3 % (ref 40–54)
HDLC SERPL-MCNC: 48 MG/DL
HGB BLD-MCNC: 14.5 G/DL (ref 14–18)
IMM GRANULOCYTES # BLD: 0 THOU/UL
IMM GRANULOCYTES NFR BLD: 0 %
LDLC SERPL CALC-MCNC: 75 MG/DL
LYMPHOCYTES # BLD AUTO: 1.1 THOU/UL (ref 0.8–4.4)
LYMPHOCYTES NFR BLD AUTO: 16 % (ref 20–40)
MCH RBC QN AUTO: 30.5 PG (ref 27–34)
MCHC RBC AUTO-ENTMCNC: 34.3 G/DL (ref 32–36)
MCV RBC AUTO: 89 FL (ref 80–100)
MICROALBUMIN UR-MCNC: 0.59 MG/DL (ref 0–1.99)
MICROALBUMIN/CREAT UR: 8.2 MG/G
MONOCYTES # BLD AUTO: 0.7 THOU/UL (ref 0–0.9)
MONOCYTES NFR BLD AUTO: 10 % (ref 2–10)
NEUTROPHILS # BLD AUTO: 4.7 THOU/UL (ref 2–7.7)
NEUTROPHILS NFR BLD AUTO: 67 % (ref 50–70)
PLATELET # BLD AUTO: 180 THOU/UL (ref 140–440)
PMV BLD AUTO: 10.3 FL (ref 7–10)
POTASSIUM BLD-SCNC: 4.7 MMOL/L (ref 3.5–5)
PROT SERPL-MCNC: 7 G/DL (ref 6–8)
RBC # BLD AUTO: 4.75 MILL/UL (ref 4.4–6.2)
SODIUM SERPL-SCNC: 142 MMOL/L (ref 136–145)
TRIGL SERPL-MCNC: 103 MG/DL
WBC: 7 THOU/UL (ref 4–11)

## 2021-03-22 ASSESSMENT — MIFFLIN-ST. JEOR: SCORE: 1401.72

## 2021-03-23 ENCOUNTER — COMMUNICATION - HEALTHEAST (OUTPATIENT)
Dept: FAMILY MEDICINE | Facility: CLINIC | Age: 76
End: 2021-03-23

## 2021-03-23 LAB
25(OH)D3 SERPL-MCNC: 50.2 NG/ML (ref 30–80)
HCV AB SERPL QL IA: NEGATIVE

## 2021-04-28 ENCOUNTER — RECORDS - HEALTHEAST (OUTPATIENT)
Dept: ADMINISTRATIVE | Facility: OTHER | Age: 76
End: 2021-04-28

## 2021-05-23 ENCOUNTER — COMMUNICATION - HEALTHEAST (OUTPATIENT)
Dept: FAMILY MEDICINE | Facility: CLINIC | Age: 76
End: 2021-05-23

## 2021-05-23 DIAGNOSIS — E11.9 TYPE 2 DIABETES MELLITUS (H): ICD-10-CM

## 2021-05-24 RX ORDER — GLIMEPIRIDE 2 MG/1
TABLET ORAL
Qty: 180 TABLET | Refills: 3 | Status: SHIPPED | OUTPATIENT
Start: 2021-05-24 | End: 2022-05-08

## 2021-05-28 NOTE — TELEPHONE ENCOUNTER
Refill Approved    Rx renewed per Medication Renewal Policy. Medication was last renewed on 1/1/18.    Krystle Dougherty, [facility] Triage/Med Refill 4/26/2019     Requested Prescriptions   Pending Prescriptions Disp Refills     glimepiride (AMARYL) 2 MG tablet [Pharmacy Med Name: GLIMEPIRIDE 2MG TABLETS] 180 tablet 0     Sig: TAKE 2 TABLETS(4 MG) BY MOUTH DAILY       Oral Hypoglycemics Refill Protocol Failed - 4/24/2019 10:42 AM        Failed - Visit with PCP or prescribing provider visit in last 6 months       Last office visit with prescriber/PCP: Visit date not found OR same dept: 8/13/2018 Joana Mckeon MD OR same specialty: 8/13/2018 Joana Mckeon MD Last physical: Visit date not found Last MTM visit: Visit date not found         Next appt within 3 mo: Visit date not found  Next physical within 3 mo: Visit date not found  Prescriber OR PCP: Joana Mckeon MD  Last diagnosis associated with med order: 1. Type 2 diabetes mellitus (H)  - glimepiride (AMARYL) 2 MG tablet [Pharmacy Med Name: GLIMEPIRIDE 2MG TABLETS]; TAKE 2 TABLETS(4 MG) BY MOUTH DAILY  Dispense: 180 tablet; Refill: 0    2. Essential hypertension, benign  - atorvastatin (LIPITOR) 10 MG tablet [Pharmacy Med Name: ATORVASTATIN 10MG TABLETS]; TAKE 1 TABLET(10 MG) BY MOUTH DAILY  Dispense: 90 tablet; Refill: 0     If protocol passes may refill for 12 months if within 3 months of last provider visit (or a total of 15 months).           Failed - A1C in last 6 months     Hemoglobin A1c   Date Value Ref Range Status   08/13/2018 7.2 (H) 3.5 - 6.0 % Final               Passed - Microalbumin in last year      Microalbumin, Random Urine   Date Value Ref Range Status   08/13/2018 0.79 0.00 - 1.99 mg/dL Final                  Passed - Blood pressure in last year     BP Readings from Last 1 Encounters:   08/13/18 128/60             Passed - Serum creatinine in last year     Creatinine   Date Value Ref Range Status    08/13/2018 0.97 0.70 - 1.30 mg/dL Final             atorvastatin (LIPITOR) 10 MG tablet [Pharmacy Med Name: ATORVASTATIN 10MG TABLETS] 90 tablet 0     Sig: TAKE 1 TABLET(10 MG) BY MOUTH DAILY       Statins Refill Protocol (Hmg CoA Reductase Inhibitors) Passed - 4/24/2019 10:42 AM        Passed - PCP or prescribing provider visit in past 12 months      Last office visit with prescriber/PCP: 8/13/2018 Joana Mckeon MD OR same dept: 8/13/2018 Joana Mckeon MD OR same specialty: 8/13/2018 Joana Mckeon MD  Last physical: 8/11/2017 Last MTM visit: Visit date not found   Next visit within 3 mo: Visit date not found  Next physical within 3 mo: Visit date not found  Prescriber OR PCP: Joana Mckeon MD  Last diagnosis associated with med order: 1. Type 2 diabetes mellitus (H)  - glimepiride (AMARYL) 2 MG tablet [Pharmacy Med Name: GLIMEPIRIDE 2MG TABLETS]; TAKE 2 TABLETS(4 MG) BY MOUTH DAILY  Dispense: 180 tablet; Refill: 0    2. Essential hypertension, benign  - atorvastatin (LIPITOR) 10 MG tablet [Pharmacy Med Name: ATORVASTATIN 10MG TABLETS]; TAKE 1 TABLET(10 MG) BY MOUTH DAILY  Dispense: 90 tablet; Refill: 0    If protocol passes may refill for 12 months if within 3 months of last provider visit (or a total of 15 months).

## 2021-05-28 NOTE — TELEPHONE ENCOUNTER
RN cannot approve Refill Request    RN can NOT refill this medication Protocol failed and NO refill given.       Krystle Dougherty, [facility] Triage/Med Refill 4/26/2019    Requested Prescriptions   Pending Prescriptions Disp Refills     glimepiride (AMARYL) 2 MG tablet [Pharmacy Med Name: GLIMEPIRIDE 2MG TABLETS] 180 tablet 0     Sig: TAKE 2 TABLETS(4 MG) BY MOUTH DAILY       Oral Hypoglycemics Refill Protocol Failed - 4/24/2019 10:42 AM        Failed - Visit with PCP or prescribing provider visit in last 6 months       Last office visit with prescriber/PCP: Visit date not found OR same dept: 8/13/2018 Joana Mckeon MD OR same specialty: 8/13/2018 Joana Mckeon MD Last physical: Visit date not found Last MTM visit: Visit date not found         Next appt within 3 mo: Visit date not found  Next physical within 3 mo: Visit date not found  Prescriber OR PCP: Joana Mckeon MD  Last diagnosis associated with med order: 1. Type 2 diabetes mellitus (H)  - glimepiride (AMARYL) 2 MG tablet [Pharmacy Med Name: GLIMEPIRIDE 2MG TABLETS]; TAKE 2 TABLETS(4 MG) BY MOUTH DAILY  Dispense: 180 tablet; Refill: 0    2. Essential hypertension, benign  - atorvastatin (LIPITOR) 10 MG tablet; TAKE 1 TABLET(10 MG) BY MOUTH DAILY  Dispense: 90 tablet; Refill: 0     If protocol passes may refill for 12 months if within 3 months of last provider visit (or a total of 15 months).           Failed - A1C in last 6 months     Hemoglobin A1c   Date Value Ref Range Status   08/13/2018 7.2 (H) 3.5 - 6.0 % Final               Passed - Microalbumin in last year      Microalbumin, Random Urine   Date Value Ref Range Status   08/13/2018 0.79 0.00 - 1.99 mg/dL Final                  Passed - Blood pressure in last year     BP Readings from Last 1 Encounters:   08/13/18 128/60             Passed - Serum creatinine in last year     Creatinine   Date Value Ref Range Status   08/13/2018 0.97 0.70 - 1.30 mg/dL Final            Signed Prescriptions Disp Refills    atorvastatin (LIPITOR) 10 MG tablet 90 tablet 0     Sig: TAKE 1 TABLET(10 MG) BY MOUTH DAILY       Statins Refill Protocol (Hmg CoA Reductase Inhibitors) Passed - 4/24/2019 10:42 AM        Passed - PCP or prescribing provider visit in past 12 months      Last office visit with prescriber/PCP: 8/13/2018 oJana Mckeon MD OR same dept: 8/13/2018 Joana Mckeon MD OR same specialty: 8/13/2018 Joana Mckeon MD  Last physical: 8/11/2017 Last MTM visit: Visit date not found   Next visit within 3 mo: Visit date not found  Next physical within 3 mo: Visit date not found  Prescriber OR PCP: Joana Mckeon MD  Last diagnosis associated with med order: 1. Type 2 diabetes mellitus (H)  - glimepiride (AMARYL) 2 MG tablet [Pharmacy Med Name: GLIMEPIRIDE 2MG TABLETS]; TAKE 2 TABLETS(4 MG) BY MOUTH DAILY  Dispense: 180 tablet; Refill: 0    2. Essential hypertension, benign  - atorvastatin (LIPITOR) 10 MG tablet; TAKE 1 TABLET(10 MG) BY MOUTH DAILY  Dispense: 90 tablet; Refill: 0    If protocol passes may refill for 12 months if within 3 months of last provider visit (or a total of 15 months).

## 2021-05-28 NOTE — TELEPHONE ENCOUNTER
Refill Approved    Rx renewed per Medication Renewal Policy. Medication was last renewed on 12/14/18.    Krystle Dougherty, [facility] Connection Triage/Med Refill 4/26/2019     Requested Prescriptions   Pending Prescriptions Disp Refills     amLODIPine (NORVASC) 2.5 MG tablet [Pharmacy Med Name: AMLODIPINE BESYLATE 2.5MG TABLETS] 90 tablet 0     Sig: TAKE 1 TABLET BY MOUTH EVERY DAY       Calcium-Channel Blockers Protocol Passed - 4/24/2019 10:43 AM        Passed - PCP or prescribing provider visit in past 12 months or next 3 months     Last office visit with prescriber/PCP: Visit date not found OR same dept: 8/13/2018 Joana Mckeon MD OR same specialty: 8/13/2018 Joana Mckeon MD  Last physical: Visit date not found Last MTM visit: Visit date not found   Next visit within 3 mo: Visit date not found  Next physical within 3 mo: Visit date not found  Prescriber OR PCP: Amrit Juares MD  Last diagnosis associated with med order: 1. Essential hypertension, benign  - amLODIPine (NORVASC) 2.5 MG tablet [Pharmacy Med Name: AMLODIPINE BESYLATE 2.5MG TABLETS]; TAKE 1 TABLET BY MOUTH EVERY DAY  Dispense: 90 tablet; Refill: 0    If protocol passes may refill for 12 months if within 3 months of last provider visit (or a total of 15 months).             Passed - Blood pressure filed in past 12 months     BP Readings from Last 1 Encounters:   08/13/18 128/60

## 2021-05-30 NOTE — TELEPHONE ENCOUNTER
Due to be seen    Rx renewed per Medication Renewal Policy. Medication was last renewed on 4/26/19.    Krystle Dougherty, [facility] Triage/Med Refill 7/30/2019     Requested Prescriptions   Pending Prescriptions Disp Refills     amLODIPine (NORVASC) 2.5 MG tablet [Pharmacy Med Name: AMLODIPINE BESYLATE 2.5MG TABLETS] 90 tablet 0     Sig: TAKE 1 TABLET BY MOUTH EVERY DAY       Calcium-Channel Blockers Protocol Passed - 7/30/2019 12:26 PM        Passed - PCP or prescribing provider visit in past 12 months or next 3 months     Last office visit with prescriber/PCP: Visit date not found OR same dept: 8/13/2018 Joana Mckeon MD OR same specialty: 8/13/2018 Joana Mckeon MD  Last physical: Visit date not found Last MTM visit: Visit date not found   Next visit within 3 mo: Visit date not found  Next physical within 3 mo: Visit date not found  Prescriber OR PCP: Amrit Juares MD  Last diagnosis associated with med order: 1. Essential hypertension, benign  - amLODIPine (NORVASC) 2.5 MG tablet [Pharmacy Med Name: AMLODIPINE BESYLATE 2.5MG TABLETS]; TAKE 1 TABLET BY MOUTH EVERY DAY  Dispense: 90 tablet; Refill: 0    If protocol passes may refill for 12 months if within 3 months of last provider visit (or a total of 15 months).             Passed - Blood pressure filed in past 12 months     BP Readings from Last 1 Encounters:   08/13/18 128/60

## 2021-05-31 VITALS — HEIGHT: 70 IN | BODY MASS INDEX: 22.65 KG/M2 | WEIGHT: 158.25 LBS

## 2021-06-01 VITALS — BODY MASS INDEX: 22.05 KG/M2 | HEIGHT: 70 IN | WEIGHT: 154 LBS

## 2021-06-01 NOTE — TELEPHONE ENCOUNTER
Refill Approved    Rx renewed per Medication Renewal Policy. Medication was last renewed on 4/26/19.    Esperanza Mora, [facility] Triage/Med Refill 9/27/2019     Requested Prescriptions   Pending Prescriptions Disp Refills     atorvastatin (LIPITOR) 10 MG tablet [Pharmacy Med Name: ATORVASTATIN 10MG TABLETS] 90 tablet 0     Sig: TAKE 1 TABLET(10 MG) BY MOUTH DAILY       Statins Refill Protocol (Hmg CoA Reductase Inhibitors) Passed - 9/27/2019  3:17 AM        Passed - PCP or prescribing provider visit in past 12 months      Last office visit with prescriber/PCP: 8/13/2018 Joana Mckeon MD OR same dept: Visit date not found OR same specialty: 8/13/2018 Joana Mckeon MD  Last physical: 9/4/2019 Last MTM visit: Visit date not found   Next visit within 3 mo: Visit date not found  Next physical within 3 mo: Visit date not found  Prescriber OR PCP: Joana Mckeon MD  Last diagnosis associated with med order: 1. Essential hypertension, benign  - atorvastatin (LIPITOR) 10 MG tablet [Pharmacy Med Name: ATORVASTATIN 10MG TABLETS]; TAKE 1 TABLET(10 MG) BY MOUTH DAILY  Dispense: 90 tablet; Refill: 0    If protocol passes may refill for 12 months if within 3 months of last provider visit (or a total of 15 months).

## 2021-06-01 NOTE — PROGRESS NOTES
Assessment and Plan:       Chinedu was seen today for annual wellness visit.    Routine general medical examination at a health care facility  He will get Shingrix/ flu shots at pharmacy.  Will fill out HC directive.  Discussed pros/ cons to stopping colon and prostate cancer screening.  He will consider and discuss with urology.  Colonoscopy not due til 2023.    Vitamin D deficiency  -     Vitamin D, Total (25-Hydroxy)    Controlled type 2 diabetes mellitus without complication, without long-term current use of insulin (H)- Controlled.  -     Microalbumin, Random Urine  -     Lipid Cascade  -     Comprehensive Metabolic Panel  -     Glycosylated Hemoglobin A1c  -     Magnesium    Benign Essential Hypertension-Controlled    Hypercholesteremia- Controlled        The patient's current medical problems were reviewed.    I have had an Advance Directives discussion with the patient.  The following health maintenance schedule was reviewed with the patient and provided in printed form in the after visit summary:   Health Maintenance   Topic Date Due     DIABETES FOOT EXAM  08/16/1955     ZOSTER VACCINES (1 of 2) 08/16/1993     ADVANCE CARE PLANNING  07/24/2017     DIABETES FOLLOW-UP  02/11/2018     MEDICARE ANNUAL WELLNESS VISIT  08/11/2018     DIABETES HEMOGLOBIN A1C  02/13/2019     INFLUENZA VACCINE RULE BASED (1) 08/01/2019     DIABETES URINE MICROALBUMIN  08/13/2019     FALL RISK ASSESSMENT  08/13/2019     DIABETES OPHTHALMOLOGY EXAM  05/23/2020     TD 18+ HE  10/27/2020     PNEUMOCOCCAL POLYSACCHARIDE VACCINE AGE 65 AND OVER  Completed     PNEUMOCOCCAL CONJUGATE VACCINE FOR ADULTS (PCV13 OR PREVNAR)  Completed        Subjective:   Chief Complaint: Chinedu Cifuentes is an 74 y.o. male here for an Annual Wellness visit.   HPI:  1) diabetes mellitus type 2 - Blood sugar readings 103/ 107.  Walks 1-2 hours / day. Taking Amaryl 4 mg daily. Had eye exam with no retinopathy this year.  No numbness/tingling or lesions in  lower extremities.  2) Hypercholesterolemia - Taking statin with CoQ10.  No myalgias.  3) HTN - Taking amlodipine/ benazepril for BP control.  4) Colonoscopy- Had 2 adenomatous polyps in 2018.  5 year recheck recommended.  5) Sees urology yearly for PSA checks.    Review of Systems:    Please see above.  The rest of the review of systems are negative for all systems.    SH: .  Retired professor.  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  Travels a lot- going to [facility]/ [place] / [facility] this fall.    Patient Care Team:  Joana Mckeon MD as PCP - General  Joana Mckeon MD as Assigned PCP     Patient Active Problem List   Diagnosis     Benign Essential Hypertension     Diabetes mellitus type 2, controlled (H)     Adenomatous polyp of colon, unspecified part of colon     Vitamin D Deficiency     Hypercholesteremia     Actinic Keratosis     Serology Prostate-specific Antigen (PSA) Elevated     Tinea pedis of both feet     No past medical history on file.   No past surgical history on file.   Family History   Problem Relation Age of Onset     Hyperlipidemia Mother      Lung disease Mother      Depression Mother      Diabetes type II Father      Hypertension Father      Hyperlipidemia Father      Diabetes type II Sister      Brain cancer Sister      Diabetes type II Brother      Hyperlipidemia Brother      No Medical Problems Maternal Grandmother      No Medical Problems Maternal Grandfather      Dementia Paternal Grandmother      Colon cancer Paternal Grandfather 97     No Medical Problems Daughter      No Medical Problems Daughter      No Medical Problems Daughter       Social History     Socioeconomic History     Marital status:      Spouse name: Not on file     Number of children: Not on file     Years of education: Not on file     Highest education level: Not on file   Occupational History     Not on file   Social Needs     Financial resource strain: Not on file     Food insecurity:     Worry: Not  on file     Inability: Not on file     Transportation needs:     Medical: Not on file     Non-medical: Not on file   Tobacco Use     Smoking status: Never Smoker     Smokeless tobacco: Never Used   Substance and Sexual Activity     Alcohol use: Yes     Alcohol/week: 4.2 oz     Types: 7 Glasses of wine per week     Drug use: No     Sexual activity: Not on file   Lifestyle     Physical activity:     Days per week: Not on file     Minutes per session: Not on file     Stress: Not on file   Relationships     Social connections:     Talks on phone: Not on file     Gets together: Not on file     Attends Tenriism service: Not on file     Active member of club or organization: Not on file     Attends meetings of clubs or organizations: Not on file     Relationship status: Not on file     Intimate partner violence:     Fear of current or ex partner: Not on file     Emotionally abused: Not on file     Physically abused: Not on file     Forced sexual activity: Not on file   Other Topics Concern     Not on file   Social History Narrative     Not on file      Current Outpatient Medications   Medication Sig Dispense Refill     amLODIPine (NORVASC) 2.5 MG tablet TAKE 1 TABLET BY MOUTH EVERY DAY 90 tablet 0     atorvastatin (LIPITOR) 10 MG tablet TAKE 1 TABLET(10 MG) BY MOUTH DAILY 90 tablet 0     benazepril (LOTENSIN) 10 MG tablet Take 1 tablet (10 mg total) by mouth daily. 90 tablet 3     blood glucose meter (GLUCOMETER) Dispense glucometer brand per patient's insurance at pharmacy discretion. 1 each 0     blood glucose test strips Use 1 each As Directed as needed. Dispense brand per patient's insurance at pharmacy discretion. 100 each 11     CALCIUM CITRATE/VITAMIN D3 (CITRACAL REGULAR ORAL) Take 1 tablet by mouth daily.        generic lancets Use 1 each As Directed as needed. Dispense brand per patient's insurance at pharmacy discretion. 100 each 11     glimepiride (AMARYL) 2 MG tablet TAKE 2 TABLETS(4 MG) BY MOUTH DAILY 180  "tablet 0     UBIDECARENONE (COQ-10 ORAL) Take 1 tablet by mouth every other day.        aspirin 81 mg TbEF Take 1 tablet by mouth daily.       clotrimazole-betamethasone (LOTRISONE) cream APPLY AND RUB THIN LAYER EXTERNALLY TO THE AFFECTED AREA EVERY MORNING AND EVERY EVENING 30 g 0     ergocalciferol (ERGOCALCIFEROL) 50,000 unit capsule TAKE ONE CAPSULE BY MOUTH EVERY 2 WEEKS 6 capsule 3     No current facility-administered medications for this visit.       Objective:   Vital Signs:   Visit Vitals  /60 (Patient Site: Right Arm, Patient Position: Sitting, Cuff Size: Adult Regular)   Pulse (!) 51   Ht 5' 9.5\" (1.765 m)   Wt 155 lb (70.3 kg)   SpO2 100%   BMI 22.56 kg/m         VisionScreening:  No exam data present     PHYSICAL EXAM  /60 (Patient Site: Right Arm, Patient Position: Sitting, Cuff Size: Adult Regular)   Pulse (!) 51   Ht 5' 9.5\" (1.765 m)   Wt 155 lb (70.3 kg)   SpO2 100%   BMI 22.56 kg/m    No acute distress  HEENT: Head atraumatic / normocephalic.  PERRL.  Conjunctiva clear. Nose with no discharge.  Tympanic membranes grey with normal landmarks.  OP - pink and moist.  Normal dentition.  Neck: Supple.  No lymphadenopathy or thyromegaly.  Lungs: CTA.  No retractions or tachypnea.  CV: RRR. S1 and S2 normal.  No murmurs / rubs / gallops.  No lower extremity edema.    Abdomen: Soft. Non tender. Non distended.  No HSM or masses.  Skin: No rashes or lesions.  Neuro: AAOx3.  Normal strength and tone.  Normal gait.  DTRs in lower extremities equal bilaterally.  Psych: Mood and affect normal.  Good eye contact.  Normal speech.     Assessment Results 9/4/2019   Activities of Daily Living No help needed   Instrumental Activities of Daily Living No help needed   Mini Cog Total Score 5   Some recent data might be hidden     A Mini-Cog score of 0-2 suggests the possibility of dementia, score of 3-5 suggests no dementia    Identified Health Risks:     The patient was provided with written " information regarding signs of hearing loss.  Patient's advanced directive was discussed and I am comfortable with the patient's wishes.      Joana Mckeon

## 2021-06-01 NOTE — TELEPHONE ENCOUNTER
Refill Approved    Rx renewed per Medication Renewal Policy. Medication was last renewed on 4/26/19.    Krystle Dougherty, [facility] Connection Triage/Med Refill 9/25/2019     Requested Prescriptions   Pending Prescriptions Disp Refills     glimepiride (AMARYL) 2 MG tablet [Pharmacy Med Name: GLIMEPIRIDE 2MG TABLETS] 180 tablet 0     Sig: TAKE 2 TABLETS(4 MG) BY MOUTH DAILY       Oral Hypoglycemics Refill Protocol Passed - 9/24/2019  2:41 PM        Passed - Visit with PCP or prescribing provider visit in last 6 months       Last office visit with prescriber/PCP: Visit date not found OR same dept: Visit date not found OR same specialty: 8/13/2018 Joana Mckeon MD Last physical: 9/4/2019 Last MTM visit: Visit date not found         Next appt within 3 mo: Visit date not found  Next physical within 3 mo: Visit date not found  Prescriber OR PCP: Joana Mckeon MD  Last diagnosis associated with med order: 1. Type 2 diabetes mellitus (H)  - glimepiride (AMARYL) 2 MG tablet [Pharmacy Med Name: GLIMEPIRIDE 2MG TABLETS]; TAKE 2 TABLETS(4 MG) BY MOUTH DAILY  Dispense: 180 tablet; Refill: 0     If protocol passes may refill for 12 months if within 3 months of last provider visit (or a total of 15 months).           Passed - A1C in last 6 months     Hemoglobin A1c   Date Value Ref Range Status   09/04/2019 7.2 (H) 3.5 - 6.0 % Final               Passed - Microalbumin in last year      Microalbumin, Random Urine   Date Value Ref Range Status   09/04/2019 1.17 0.00 - 1.99 mg/dL Final                  Passed - Blood pressure in last year     BP Readings from Last 1 Encounters:   09/04/19 136/60             Passed - Serum creatinine in last year     Creatinine   Date Value Ref Range Status   09/04/2019 0.97 0.70 - 1.30 mg/dL Final

## 2021-06-02 NOTE — TELEPHONE ENCOUNTER
RN cannot approve Refill Request    RN can NOT refill this medication med is not covered by policy/route to provider     . Last office visit: 8/13/2018 Joana Mckeon MD Last Physical: 9/4/2019 Last MTM visit: Visit date not found Last visit same specialty: 8/13/2018 Joana Mckeon MD.  Next visit within 3 mo: Visit date not found  Next physical within 3 mo: Visit date not found      Krystle Dougherty, [facility] Triage/Med Refill 10/8/2019    Requested Prescriptions   Pending Prescriptions Disp Refills     ergocalciferol (ERGOCALCIFEROL) 50,000 unit capsule [Pharmacy Med Name: VITAMIN D2 50,000IU (ERGO) CAP RX] 6 capsule 0     Sig: TAKE ONE CAPSULE BY MOUTH EVERY 2 WEEKS       There is no refill protocol information for this order

## 2021-06-02 NOTE — TELEPHONE ENCOUNTER
Refill Approved    Rx renewed per Medication Renewal Policy. Medication was last renewed on 10/23/18 and 7/31/19.    Shanta Grey, [facility] Triage/Med Refill 10/24/2019     Requested Prescriptions   Pending Prescriptions Disp Refills     benazepril (LOTENSIN) 10 MG tablet [Pharmacy Med Name: BENAZEPRIL 10MG TABLETS] 90 tablet 0     Sig: TAKE 1 TABLET(10 MG) BY MOUTH DAILY       Ace Inhibitors Refill Protocol Passed - 10/24/2019  2:58 PM        Passed - PCP or prescribing provider visit in past 12 months       Last office visit with prescriber/PCP: 8/13/2018 Joana Mckeon MD OR same dept: Visit date not found OR same specialty: 8/13/2018 Joana Mckeon MD  Last physical: 9/4/2019 Last MTM visit: Visit date not found   Next visit within 3 mo: Visit date not found  Next physical within 3 mo: Visit date not found  Prescriber OR PCP: Joana Mckeon MD  Last diagnosis associated with med order: 1. Essential hypertension, benign  - benazepril (LOTENSIN) 10 MG tablet [Pharmacy Med Name: BENAZEPRIL 10MG TABLETS]; TAKE 1 TABLET(10 MG) BY MOUTH DAILY  Dispense: 90 tablet; Refill: 0  - amLODIPine (NORVASC) 2.5 MG tablet [Pharmacy Med Name: AMLODIPINE BESYLATE 2.5MG TABLETS]; TAKE 1 TABLET BY MOUTH EVERY DAY  Dispense: 90 tablet; Refill: 0    If protocol passes may refill for 12 months if within 3 months of last provider visit (or a total of 15 months).             Passed - Serum Potassium in past 12 months     Lab Results   Component Value Date    Potassium 4.5 09/04/2019             Passed - Blood pressure filed in past 12 months     BP Readings from Last 1 Encounters:   09/04/19 136/60             Passed - Serum Creatinine in past 12 months     Creatinine   Date Value Ref Range Status   09/04/2019 0.97 0.70 - 1.30 mg/dL Final             amLODIPine (NORVASC) 2.5 MG tablet [Pharmacy Med Name: AMLODIPINE BESYLATE 2.5MG TABLETS] 90 tablet 0     Sig: TAKE 1 TABLET BY MOUTH EVERY DAY        Calcium-Channel Blockers Protocol Passed - 10/24/2019  2:58 PM        Passed - PCP or prescribing provider visit in past 12 months or next 3 months     Last office visit with prescriber/PCP: 8/13/2018 Joana Mckeon MD OR same dept: Visit date not found OR same specialty: 8/13/2018 Joana Mckeon MD  Last physical: 9/4/2019 Last MTM visit: Visit date not found   Next visit within 3 mo: Visit date not found  Next physical within 3 mo: Visit date not found  Prescriber OR PCP: Joana Mckoen MD  Last diagnosis associated with med order: 1. Essential hypertension, benign  - benazepril (LOTENSIN) 10 MG tablet [Pharmacy Med Name: BENAZEPRIL 10MG TABLETS]; TAKE 1 TABLET(10 MG) BY MOUTH DAILY  Dispense: 90 tablet; Refill: 0  - amLODIPine (NORVASC) 2.5 MG tablet [Pharmacy Med Name: AMLODIPINE BESYLATE 2.5MG TABLETS]; TAKE 1 TABLET BY MOUTH EVERY DAY  Dispense: 90 tablet; Refill: 0    If protocol passes may refill for 12 months if within 3 months of last provider visit (or a total of 15 months).             Passed - Blood pressure filed in past 12 months     BP Readings from Last 1 Encounters:   09/04/19 136/60

## 2021-06-03 VITALS
DIASTOLIC BLOOD PRESSURE: 60 MMHG | OXYGEN SATURATION: 100 % | HEART RATE: 51 BPM | HEIGHT: 70 IN | WEIGHT: 155 LBS | SYSTOLIC BLOOD PRESSURE: 136 MMHG | BODY MASS INDEX: 22.19 KG/M2

## 2021-06-05 VITALS
OXYGEN SATURATION: 100 % | HEIGHT: 70 IN | SYSTOLIC BLOOD PRESSURE: 140 MMHG | DIASTOLIC BLOOD PRESSURE: 68 MMHG | HEART RATE: 56 BPM | WEIGHT: 149.56 LBS | RESPIRATION RATE: 16 BRPM | BODY MASS INDEX: 21.41 KG/M2 | TEMPERATURE: 97.7 F

## 2021-06-05 VITALS
RESPIRATION RATE: 20 BRPM | WEIGHT: 155 LBS | SYSTOLIC BLOOD PRESSURE: 134 MMHG | TEMPERATURE: 98.3 F | OXYGEN SATURATION: 100 % | HEART RATE: 64 BPM | DIASTOLIC BLOOD PRESSURE: 64 MMHG | BODY MASS INDEX: 22.56 KG/M2

## 2021-06-07 NOTE — TELEPHONE ENCOUNTER
"Wife calling - no consent on file.  Verbal consent given by patient over the phone.      Wife says patient has terrible pain in lower abdomen.  Pain is constant.  Patient rates pain as \"moderate\" but wife says he is moaning and groaning.  Is bent over walking around \"to walk it off.\"  Has had pain and has been vomiting non stop for 4 hours.  Has vomited 12-15 times.    Is diabetic last blood sugar was 140.    Triaged to disposition of Go to ED Now.  Wife says she will bring him to [facility] ED.  Advised [facility] ED staff patient is coming.    Patient does have a cough but wife and patient says he \"always has a cough\" nothing new.      Sidra Dennison RN  Triage Nurse Advisor      COVID 19 Nurse Triage Plan    Please be aware that novel coronavirus (COVID-19) may be circulating in the community. If you develop symptoms such as fever, cough, or SOB or if you have concerns about the presence of another infection including coronavirus (COVID-19), please contact your health care provider or visit [email or web address].     Patient HAS known exposure, fever, cough or SOB in addition to reason for call. Patient advised to go to ED.     Instructions Given to Patient  You need to be seen in the Emergency Department (ED). Leave now. Drive carefully. Follow these instructions.    You should go to the closest ED.    Another adult should drive you to the ED.    You or a care team member should call ahead to inform the ED of your symptoms and possible diagnosis of COVID-19 and get instructions about what entrance you should use to avoid going into the waiting area and risking infecting others.    Your mobile phone number should be given to whomever is referring you to the ED and entered into Epic so we can contact you.    Tell the first person you meet in the emergency department that you may have been exposed to COVID-19 so you can be directed to the appropriate entrance and not be in the general waiting room.    Regardless of if " you have been tested or not:  Patient who have symptoms (cough, fever, or shortness of breath), need to isolate for 7 days from when symptoms started OR 72 hours after fever resolves (without fever reducing medications) AND improvement of respiratory symptoms (whichever is longer).      Isolate yourself at home (in own room/own bathroom if possible)    Do Not allow any visitors    Do Not go to work or school    Do Not go to [employer],  centers, shopping, or other public places.    Do Not shake hands.    Avoid close and intimate contact with others (hugging, kissing).    Follow CDC recommendations for household cleaning of frequently touched services.     After the initial 7 days, continue to isolate yourself from household members as much as possible. To continue decrease the risk of community spread and exposure, you and any members of your household should limit activities in public for 14 days after starting home isolation.     You can reference the following CDC link for helpful home isolation/care tips:  https://www.cdc.gov/coronavirus/2019-ncov/downloads/10Things.pdf    Protect Others:    Cover Your Mouth and Nose with a mask, disposable tissue or wash cloth to avoid spreading germs to others.    Wash your hands and face frequently with soap and water      Thank you for limiting contact with others, wearing a simple mask to cover your cough, practice good hand hygiene habits and accessing our virtual services where possible to limit the spread of this virus.    For more information about COVID19 and options for caring for yourself at home, please visit the CDC website at https://www.cdc.gov/coronavirus/2019-ncov/about/steps-when-sick.html  For more options for care at [facility], please visit our website at [email or web address]      Reason for Disposition    [1] SEVERE pain AND [2] age > 60    [1] SEVERE pain (e.g., excruciating) AND [2] present > 1 hour    [1]  MODERATE vomiting (e.g., 3 - 5 times/day) AND [2] age > 60    Protocols used: ABDOMINAL PAIN - MALE-A-AH, VOMITING-A-AH

## 2021-06-07 NOTE — TELEPHONE ENCOUNTER
Pt started vomiting today, pt called ambulance, they came and took his blood pressure, blood sugar 252. Daughter intends to give pt a Zofran.  Daughter will f/u with clinic if needed.    Janiya Kirby RN, MA  [facility] RN Triage Nurse Advisor    Reason for Disposition    [1] SEVERE vomiting (e.g., 6 or more times/day, vomits everything) BUT [2] hydrated    Protocols used: VOMITING-A-AH

## 2021-06-08 NOTE — TELEPHONE ENCOUNTER
RN cannot approve Refill Request    RN can NOT refill this medication med is not covered by policy/route to provider     . Last office visit: 8/13/2018 Joana Mckeon MD Last Physical: 9/4/2019 Last MTM visit: Visit date not found Last visit same specialty: 8/13/2018 Joana Mckeon MD.  Next visit within 3 mo: Visit date not found  Next physical within 3 mo: Visit date not found      Krystle Dougherty, [facility] Triage/Med Refill 6/4/2020    Requested Prescriptions   Pending Prescriptions Disp Refills     clotrimazole-betamethasone (LOTRISONE) cream 30 g 0     Sig: APPLY AND RUB THIN LAYER EXTERNALLY TO THE AFFECTED AREA EVERY MORNING AND EVERY EVENING       There is no refill protocol information for this order

## 2021-06-08 NOTE — TELEPHONE ENCOUNTER
Call pt - due for visit.  He can do a virtual annual wellness visit or just a visit for med check.

## 2021-06-08 NOTE — TELEPHONE ENCOUNTER
RN cannot approve Refill Request    RN can NOT refill this medication Protocol failed and NO refill given.       Krystle Dougherty, [facility] Triage/Med Refill 6/16/2020    Requested Prescriptions   Pending Prescriptions Disp Refills     glimepiride (AMARYL) 2 MG tablet [Pharmacy Med Name: GLIMEPIRIDE 2MG TABLETS] 180 tablet 3     Sig: TAKE 2 TABLETS(4 MG) BY MOUTH DAILY       Oral Hypoglycemics Refill Protocol Failed - 6/15/2020  3:48 AM        Failed - Visit with PCP or prescribing provider visit in last 6 months       Last office visit with prescriber/PCP: Visit date not found OR same dept: Visit date not found OR same specialty: 8/13/2018 Joana Mckeon MD Last physical: Visit date not found Last MTM visit: Visit date not found         Next appt within 3 mo: Visit date not found  Next physical within 3 mo: Visit date not found  Prescriber OR PCP: Joana Mckeon MD  Last diagnosis associated with med order: 1. Type 2 diabetes mellitus (H)  - glimepiride (AMARYL) 2 MG tablet [Pharmacy Med Name: GLIMEPIRIDE 2MG TABLETS]; TAKE 2 TABLETS(4 MG) BY MOUTH DAILY  Dispense: 180 tablet; Refill: 3     If protocol passes may refill for 12 months if within 3 months of last provider visit (or a total of 15 months).           Failed - A1C in last 6 months     Hemoglobin A1c   Date Value Ref Range Status   09/04/2019 7.2 (H) 3.5 - 6.0 % Final               Passed - Microalbumin in last year      Microalbumin, Random Urine   Date Value Ref Range Status   09/04/2019 1.17 0.00 - 1.99 mg/dL Final                  Passed - Blood pressure in last year     BP Readings from Last 1 Encounters:   09/04/19 136/60             Passed - Serum creatinine in last year     Creatinine   Date Value Ref Range Status   09/04/2019 0.97 0.70 - 1.30 mg/dL Final

## 2021-06-09 NOTE — PROGRESS NOTES
"Chinedu Cifuentes is a 74 y.o. male who is being evaluated via a billable telephone visit.      The patient has been notified of following:     \"This telephone visit will be conducted via a call between you and your physician/provider. We have found that certain health care needs can be provided without the need for a physical exam.  This service lets us provide the care you need with a short phone conversation.  If a prescription is necessary we can send it directly to your pharmacy.  If lab work is needed we can place an order for that and you can then stop by our lab to have the test done at a later time.    Telephone visits are billed at different rates depending on your insurance coverage. During this emergency period, for some insurers they may be billed the same as an in-person visit.  Please reach out to your insurance provider with any questions.    If during the course of the call the physician/provider feels a telephone visit is not appropriate, you will not be charged for this service.\"    Patient has given verbal consent to a Telephone visit? Yes    What phone number would you like to be contacted at? [phone]     Patient would like to receive their AVS by AVS Preference: Kyle.    Additional provider notes: no     SUBJECTIVE: Chinedu Cifuentes is a 74 y.o. male with:  Chief Complaint   Patient presents with     Follow-up     med check     Abdominal Pain     One episodes of severe pain x 3/17 like having Gallstone, but nothing since then.     1) diabetes mellitus type 2 - Blood sugars have been under 150.  No trouble with medicines.  Taking glimepiride.    2) Abdominal pain - He was eating March 17 then had nausea.  Vomited several times during the day.  Had pain in epigastric area.  No jaundice / fevers.  He did have chills and was shaking.  No diarrhea initially.  Later on had some loose stools.  He went to sleep and when he woke up the felt better.  911 was called and vitals were stable- blood " sugar was in upper 200s for a few days. He went on a liquid diet and pain improved.  He spoke with a physician friend and thought it could have been pancreatitis.  No one else in family is ill.  Has not had any other episodes of pain.  He is s/p choly.    3) HTN - Taking amlodipine / benazepril.  No side effects from medication.    4) Hypercholesterolemia - He continues on atorvastatin with no side effects    SH: .  Retired professor.  ROS:  No visual symptoms/ shortness of breath / chest pain.    OBJECTIVE: no distress    Lab Results   Component Value Date    HGBA1C 7.2 (H) 09/04/2019         Assessment/Plan:  1. Controlled type 2 diabetes mellitus without complication, without long-term current use of insulin (H)  He is due for lab work.  His diabetes mellitus appears to be well controlled.  Recommend yearly eye exam.  Will do face to face visit in future.  - Glycosylated Hemoglobin A1c; Future  - Lipid Cascade; Future  - Microalbumin, Random Urine; Future    2. Epigastric pain  Unclear cause of his pain- possibly attack pancreatitis/ gastroenteritis ?  Will check labs.  If recurrent pain, he should be seen and would order ultrasound.  - HM1(CBC and Differential); Future  - Comprehensive Metabolic Panel; Future  - Lipase; Future    3. Vitamin D deficiency  - Vitamin D, Total (25-Hydroxy); Future        Phone call duration:  17 minutes    LORIE Barrera

## 2021-06-10 NOTE — TELEPHONE ENCOUNTER
Patient has lab appt on 9/16/2020 for repeat blood count. Order was put in as same day; please change to future order.    Thanks

## 2021-06-11 NOTE — PROGRESS NOTES
CHIEF COMPLAINT:   Chief Complaint   Patient presents with     Cerumen Impaction     BL, greater on the left side. Audio after.     Hearing Loss     BL         HISTORY OF PRESENT ILLNESS    Chinedu was seen at the behest of Joana Mckeon for ear cleaning and hearing test.  Longstanding hearing loss.   No dizziness or vertigo.  Recently was treated for bilateral OE with ciprodex otic drops (finished 2 days ago).   Drops burned initially when he started using them.  Still some left ear discomfort.  No drainage at present.     Chief Complaint   Patient presents with     Cerumen Impaction     BL, greater on the left side. Audio after.     Hearing Loss     BL     9/21  visit:    Clinical Decision Making:  Suspect patient has both cerumen impaction and otitis externa.  Patient was started on Ciprodex today.  He was instructed to follow-up with audiology next week.  At the end of the encounter, I discussed results, diagnosis, medications. Discussed red flags for immediate return to clinic/ER, as well as indications for follow up if no improvement. Patient understood and agreed to plan. Patient was stable for discharge.     1. Acute otitis externa of both ears, unspecified type  ciprofloxacin-dexamethasone (CIPRODEX) otic suspension          REVIEW OF SYSTEMS    Review of Systems: a 10-system review is reviewed at this encounter.  See scanned document.     Patient has no known allergies.     PHYSICAL EXAM:        HEAD: Normal appearance and symmetry:  No cutaneous lesions.      EARS:    Right ear:  Some moist yellow disharge lateral EAC with ear hair and cerumen (removed using #5 suction. TM intact.   Left ear:   Soft cerumen impaction mixed with EAC hair, EAC is o/w clean and dry.  TM intact.          NOSE:    Dorsum:   straight  Septum:  Normal  Mucosa:  moist  Inferior turbinates:  2+       ORAL CAVITY/OROPHARYNX:    Lips:  Normal.  Tongue: normal, midline  Mucosa:   no lesions  Tonsils:  2+      NECK:   Trachea:  midline.   Thyroid:  normal   Adenopathy:  none       NEURO:   Alert and Oriented    GAIT AND STATION:  normal     RESPIRATORY:   Symmetry and Respiratory effort         IMPRESSION:    Encounter Diagnoses   Name Primary?     Infective otitis externa, bilateral Yes     Bilateral impacted cerumen      Sensorineural hearing loss (SNHL) of both ears           RECOMMENDATIONS:    Continue ciprodex drops in right ear for additional 5 days  Return every 6 months to have ears cleaned  Keep ears dry, no Qtip use.   Annual hearing test  Consider Hearing aids in future

## 2021-06-11 NOTE — TELEPHONE ENCOUNTER
Refill Approved    Rx renewed per Medication Renewal Policy. Medication was last renewed on vv 7/22/20.    Krystle Dougherty, [facility] Triage/Med Refill 9/10/2020     Requested Prescriptions   Pending Prescriptions Disp Refills     amLODIPine (NORVASC) 2.5 MG tablet [Pharmacy Med Name: AMLODIPINE BESYLATE 2.5MG TABLETS] 90 tablet 3     Sig: TAKE 1 TABLET BY MOUTH EVERY DAY       Calcium-Channel Blockers Protocol Failed - 9/9/2020  3:51 AM        Failed - Blood pressure filed in past 12 months     BP Readings from Last 1 Encounters:   09/04/19 136/60             Passed - PCP or prescribing provider visit in past 12 months or next 3 months     Last office visit with prescriber/PCP: 8/13/2018 Joana Mckeon MD OR same dept: Visit date not found OR same specialty: 8/13/2018 Joana Mckeno MD  Last physical: 9/4/2019 Last MTM visit: Visit date not found   Next visit within 3 mo: Visit date not found  Next physical within 3 mo: Visit date not found  Prescriber OR PCP: Joana Mckeon MD  Last diagnosis associated with med order: 1. Essential hypertension, benign  - amLODIPine (NORVASC) 2.5 MG tablet [Pharmacy Med Name: AMLODIPINE BESYLATE 2.5MG TABLETS]; TAKE 1 TABLET BY MOUTH EVERY DAY  Dispense: 90 tablet; Refill: 3  - benazepriL (LOTENSIN) 10 MG tablet [Pharmacy Med Name: BENAZEPRIL 10MG TABLETS]; TAKE 1 TABLET(10 MG) BY MOUTH DAILY  Dispense: 90 tablet; Refill: 3    If protocol passes may refill for 12 months if within 3 months of last provider visit (or a total of 15 months).                benazepriL (LOTENSIN) 10 MG tablet [Pharmacy Med Name: BENAZEPRIL 10MG TABLETS] 90 tablet 3     Sig: TAKE 1 TABLET(10 MG) BY MOUTH DAILY       Ace Inhibitors Refill Protocol Failed - 9/9/2020  3:51 AM        Failed - Blood pressure filed in past 12 months     BP Readings from Last 1 Encounters:   09/04/19 136/60             Passed - PCP or prescribing provider visit in past 12 months       Last  office visit with prescriber/PCP: 8/13/2018 Joana Mckeon MD OR same dept: Visit date not found OR same specialty: 8/13/2018 Joana Mckeon MD  Last physical: 9/4/2019 Last MTM visit: Visit date not found   Next visit within 3 mo: Visit date not found  Next physical within 3 mo: Visit date not found  Prescriber OR PCP: Joana Mckeon MD  Last diagnosis associated with med order: 1. Essential hypertension, benign  - amLODIPine (NORVASC) 2.5 MG tablet [Pharmacy Med Name: AMLODIPINE BESYLATE 2.5MG TABLETS]; TAKE 1 TABLET BY MOUTH EVERY DAY  Dispense: 90 tablet; Refill: 3  - benazepriL (LOTENSIN) 10 MG tablet [Pharmacy Med Name: BENAZEPRIL 10MG TABLETS]; TAKE 1 TABLET(10 MG) BY MOUTH DAILY  Dispense: 90 tablet; Refill: 3    If protocol passes may refill for 12 months if within 3 months of last provider visit (or a total of 15 months).             Passed - Serum Potassium in past 12 months     Lab Results   Component Value Date    Potassium 4.5 07/27/2020             Passed - Serum Creatinine in past 12 months     Creatinine   Date Value Ref Range Status   07/27/2020 0.98 0.70 - 1.30 mg/dL Final

## 2021-06-11 NOTE — PATIENT INSTRUCTIONS - HE
Continue ciprodex drops in right ear for additional 5 days  Return every 6 months to have ears cleaned  Keep ears dry, no Qtip use.   Annual hearing test  Consider Hearing aids in future

## 2021-06-11 NOTE — PROGRESS NOTES
Chief Complaint   Patient presents with     Ear Pain     Both ears hurting x 1 wk       HPI:  Chinedu Cifuentes is a 75 y.o. male past medical history of DM 2 hypertension who presents today complaining of bilateral ear pain x 1 week.  Patient has a past medical history of cerumen impactions, but has not had any irrigation over the past 10 years.  He reports decreased hearing along with this pressure sensation and pain.  Week ago he started using an over-the-counter cerumen remedy and over the past few days he has been trying all of oil or baby oil in the ears.    History obtained from the patient.    Problem List:  2019-09: Tinea pedis of both feet  Benign Essential Hypertension  Diabetes mellitus type 2, controlled (H)  Adenomatous polyp of colon, unspecified part of colon  Vitamin D Deficiency  Hypercholesteremia  Actinic Keratosis  Serology Prostate-specific Antigen (PSA) Elevated      No past medical history on file.    Social History     Tobacco Use     Smoking status: Never Smoker     Smokeless tobacco: Never Used   Substance Use Topics     Alcohol use: Yes     Alcohol/week: 7.0 standard drinks     Types: 7 Glasses of wine per week       Review of Systems   Constitutional: Negative for chills, fever and unexpected weight change.   HENT: Positive for ear pain and hearing loss. Negative for congestion, ear discharge and rhinorrhea.    Respiratory: Negative for cough.    Gastrointestinal: Negative for diarrhea.       Vitals:    09/21/20 0826   BP: 134/64   Patient Site: Right Arm   Patient Position: Sitting   Cuff Size: Adult Regular   Pulse: 64   Resp: 20   Temp: 98.3  F (36.8  C)   TempSrc: Oral   SpO2: 100%   Weight: 155 lb (70.3 kg)       Physical Exam  Vitals signs and nursing note reviewed.   Constitutional:       General: He is not in acute distress.     Appearance: He is well-developed. He is not diaphoretic.   HENT:      Head: Normocephalic and atraumatic.      Right Ear: External ear normal.      Left  Ear: External ear normal.      Ears:      Comments: Patient has a lot of external ear hair partially obscuring my view of the canal.  The canal seems very wet bilaterally with dark yellow material.  Irrigation was completed to try to remove dark yellow material, but canal is still swollen in an upward angle obscuring the view of the tympanic membrane bilaterally.  Patient has improvement in his hearing after the irrigation.  Eyes:      General:         Right eye: No discharge.         Left eye: No discharge.      Conjunctiva/sclera: Conjunctivae normal.   Pulmonary:      Effort: Pulmonary effort is normal. No respiratory distress.   Neurological:      Mental Status: He is alert.   Psychiatric:         Behavior: Behavior normal.         Thought Content: Thought content normal.         Judgment: Judgment normal.       Clinical Decision Making:  Suspect patient has both cerumen impaction and otitis externa.  Patient was started on Ciprodex today.  He was instructed to follow-up with audiology next week.  At the end of the encounter, I discussed results, diagnosis, medications. Discussed red flags for immediate return to clinic/ER, as well as indications for follow up if no improvement. Patient understood and agreed to plan. Patient was stable for discharge.    1. Acute otitis externa of both ears, unspecified type  ciprofloxacin-dexamethasone (CIPRODEX) otic suspension   2. Bilateral impacted cerumen  Nursing communication   3. Need for influenza vaccination  DISCONTINUED: influenza vaccine high dose (PF) (age >/= 65) 2020-21 injection 0.7 mL (FLUZONE HIGH DOSE)         Patient Instructions   1.  Keep ears completely dry during the time that  is on treatment. No swimming. Showering is ok, but keep ears away from the water as much as possible.   2.  Follow-up if no improvement in pain over the course the next 3 to 4 days.  Follow-up sooner if worsening symptoms such as fever develop.  3.  May tive Tylenol or ibuprofen  as needed for pain control.  4.  A mixture of 1 part white vinegar to 1 part rubbing alcohol may help promote drying and prevent the growth of bacteria and fungi that can cause swimmer's ear. Pour 1 teaspoon (about 5 milliliters) of the solution into each ear and let it drain back out. Do this shortly after swimming.

## 2021-06-11 NOTE — TELEPHONE ENCOUNTER
The patient's wife calls to request a refill on the drops, as they are sure that the bottle given will not last through the weekend        Refill Request  Did you contact pharmacy: Yes  Medication name:   Requested Prescriptions     Pending Prescriptions Disp Refills     ciprofloxacin-dexamethasone (CIPRODEX) otic suspension 7.5 mL 0     Si drops in affected ear twice daily x 7 days     Who prescribed the medication: Joana Mckeon MD  Requested Pharmacy: [facility] # [id number]  Is patient out of medication: No.  possibly 1 day days left  Patient notified refills processed in 3 business days:  yes  Okay to leave a detailed message: yes

## 2021-06-11 NOTE — TELEPHONE ENCOUNTER
RN cannot approve Refill Request    RN can NOT refill this medication med is not covered by policy/route to provider. Last office visit: 8/13/2018 Joana Mckeon MD Last Physical: 9/4/2019 Last MTM visit: Visit date not found Last visit same specialty: 8/13/2018 Joana Mckeon MD.  Next visit within 3 mo: Visit date not found  Next physical within 3 mo: Visit date not found      Janiya Kirby, [facility] Triage/Med Refill 9/5/2020    Requested Prescriptions   Pending Prescriptions Disp Refills     ergocalciferol (ERGOCALCIFEROL) 1,250 mcg (50,000 unit) capsule [Pharmacy Med Name: VITAMIN D2 50,000IU (ERGO) CAP RX] 6 capsule 3     Sig: TAKE ONE CAPSULE BY MOUTH EVERY 2 WEEKS       There is no refill protocol information for this order

## 2021-06-11 NOTE — TELEPHONE ENCOUNTER
RN cannot approve Refill Request    RN can NOT refill this medication med is not covered by policy/route to provider. Last office visit: 2018 Joana Mckeon MD Last Physical: 2019 Last MTM visit: Visit date not found Last visit same specialty: Visit date not found.  Next visit within 3 mo: Visit date not found  Next physical within 3 mo: Visit date not found      Krystle Dougherty, [facility] Connection Triage/Med Refill 2020    Requested Prescriptions   Pending Prescriptions Disp Refills     ciprofloxacin-dexamethasone (CIPRODEX) otic suspension 7.5 mL 0     Si drops in affected ear twice daily x 7 days       There is no refill protocol information for this order

## 2021-06-11 NOTE — PATIENT INSTRUCTIONS - HE
1.  Keep ears completely dry during the time that  is on treatment. No swimming. Showering is ok, but keep ears away from the water as much as possible.   2.  Follow-up if no improvement in pain over the course the next 3 to 4 days.  Follow-up sooner if worsening symptoms such as fever develop.  3.  May tive Tylenol or ibuprofen as needed for pain control.  4.  A mixture of 1 part white vinegar to 1 part rubbing alcohol may help promote drying and prevent the growth of bacteria and fungi that can cause swimmer's ear. Pour 1 teaspoon (about 5 milliliters) of the solution into each ear and let it drain back out. Do this shortly after swimming.

## 2021-06-11 NOTE — TELEPHONE ENCOUNTER
Wife is calling in only because of the medication they received after his WIC visit in [place] on 9/21/2020. Wife is concerned the medication looks like it will run out before Monday.    Please call wife at [phone] to advise.     Rai Cid RN [facility] Triage/Medication Refill

## 2021-06-11 NOTE — TELEPHONE ENCOUNTER
Endoscopic Ultrasound (EUS)    An endoscopic ultrasound (EUS) is a test to look at the inside of your gastrointestinal (GI) tract. It's commonly used to look for cancers or growths in the esophagus, stomach, pancreas, liver, and rectum. It can help to stage cancer (see how advanced a cancer is). EUS may also be used to help diagnose certain diseases or to drain cysts or abscesses.  What is EUS?  EUS shows both ultrasound images and live video of the GI tract. During the test, a flexible tube called an endoscope (scope) is used. At the end of the scope is a tiny video camera and light. The video camera sends live images to a monitor. The scope also contains a very small ultrasound device. This uses sound waves to create images and send them to a monitor.  A needle is passed through the scope. The needle can be used take a small sample of tissue for testing. This is called a biopsy. The needle can be used to take a sample of fluid. This is called fine-needle aspiration (FNA).  Risks and possible complications of EUS  Risks and possible complications include the following:  · Bleeding  · Infection  · A perforation (hole) in the digestive tract   · Risks of sedation or anesthesia   Before the test  Be prepared prior to the test:  · Tell your healthcare provider what medicine you take. This includes vitamins, herbs, and over-the-counter medicine. It also includes any blood thinners, such as warfarin, clopidogrel, ibuprofen, or daily aspirin. Ask your healthcare provider if you need to stop taking some or all of them before the test.  · You may be prescribed antibiotics to take before or after the test. This depends on the area being studied and what is done during the test. These medicines help prevent infection.  · Carefully follow the instructions for preparing for the test to make sure results are accurate. Instructions may include:  ¨ If youre having an EUS of the upper GI tract (esophagus, stomach, duodenum,  Please call pt to find out what his symptoms are before I refill.   pancreas, liver):  § Do not eat or drink for 6 hours before the test.  ¨ If youre having an EUS of the lower GI tract (rectum):  § Before the test, do bowel prep as instructed to clean your rectum of stool. This may involve a clear liquid diet and using a laxative (liquid or pills) the night before the test. Or it may mean doing one or more enemas the morning of the test.  § Do not eat or drink for 6 hours before the test.  · Be sure to arrive on time at the facility. Bring your identification and health insurance card. Leave valuables at home. If you have them, bring X-rays or other test results with you.  Let the healthcare provider know  For your safety, tell the healthcare provider if you:  · Take insulin. Your dose may need to be changed on the day of your test.  · Are allergic to latex.  · Have any other allergies.  · Are taking blood thinners.   During the test  An endoscopic ultrasound usually takes place in a hospital. The procedure itself may take 1 to 2 hours. You will likely go home soon afterward. During the test:  · You lie on your left side on an exam table.  · An intravenous (IV) line will be put into a vein in your arm or hand. This line supplies fluids and medicines. To keep you comfortable during the test, you will be given a sedative medicine. This medicine prevents discomfort and will make you sleepy.  · If you are having an EUS of the upper GI tract, local anesthetic may be sprayed in your throat. This will help you be more comfortable as the healthcare provider inserts the scope. The healthcare provider then gently puts the flexible scope into your mouth or nose and down your throat.  · If youre having an EUS of the lower GI tract, the healthcare provider gently puts the flexible scope into your anus.  · During the test, the scope sends live video and ultrasound images from inside your body to nearby monitors. These are used to examine your GI tract. Specialized procedures, such as drainage,  are done as needed.  · The healthcare provider may discuss the results with you soon after the test. Biopsy results take several  days.  · In most cases, you can go home within a few hours of the test. When you leave the facility, have an adult family member or friend drive you, even if you don't feel that sleepy.  After the test  Here is what to expect after the test:  · You may feel tired from the sedative. This should wear off by the end of the day.  · If you had an upper digestive endoscopy, your throat may feel sore for a day or two. Over-the-counter sore throat lozenges and spray should help.  · You can eat and drink normally as soon as the test is done.  When to call the healthcare provider  Call your healthcare provider if you notice any of the following:  · Fever of 100.4°F (38.0°C) or higher, or as advised by your healthcare provider  · Shortness of breath  · Vomiting blood, blood in stool, or black stools  · Coughing or hoarse voice that wont go away   Date Last Reviewed: 7/1/2016  © 9496-9215 [employer]. [address] [place] [id number]. All rights reserved. This information is not intended as a substitute for professional medical care. Always follow your healthcare professional's instructions.

## 2021-06-12 NOTE — PROGRESS NOTES
MALE ADULT PREVENTIVE EXAM    CHIEF COMPLAINT:  Male preventive exam.    SUBJECTIVE:  Chinedu Cifuentes is a 71 y.o. male.  He has the following complaints:  Chief Complaint   Patient presents with     Annual Exam     FASTING     Diabetes     Medication Refill      1) Right sided back pain- Started in late July. Better with ibuprofen.  No trauma or injury.  No urinary symptoms.  2) diabetes mellitus - Well controlled.  3) Needs refill of Lotrisone for tinea pedis.  Uses occasionally.    PAST MEDICAL & SURGICAL HISTORY:   Patient has Pain During Urination (Dysuria); Benign Essential Hypertension; Diabetes mellitus type 2, controlled; Benign Polyps Of The Large Intestine; Vitamin D Deficiency; Hypercholesteremia; Skin: A Rash; Actinic Keratosis; and Serology Prostate-specific Antigen (PSA) Elevated on his problem list..  He  has no past surgical history on file..  He has a current medication list which includes the following prescription(s): amlodipine, aspirin, atorvastatin, benazepril, blood glucose meter, blood glucose test, calcium citrate/vitamin d3, clotrimazole-betamethasone, generic lancets, glimepiride, ubidecarenone, vitamin d2, and tadalafil..  No Known Allergies    No problem-specific Assessment & Plan notes found for this encounter.      HABITS & SOCIAL HISTORY: .  Retired.  Spends summers in [place]. Doing lot of traveling- was in [place] this summer.  Walks 1 hour daily.  Diet: healthy.  Alcohol: 1-2 glasses wine/ day.  He  reports that he has never smoked. He has never used smokeless tobacco.  He  reports that he drinks about 4.2 oz of alcohol per week       FAMILY HISTORY:  His family history includes Brain cancer in his sister; Colon cancer (age of onset: 97) in his paternal grandfather; Dementia in his paternal grandmother; Depression in his mother; Diabetes type II in his brother, father, and sister; Hyperlipidemia in his brother, father, and mother; Hypertension in his father; Lung disease  "in his mother; No Medical Problems in his daughter, daughter, daughter, maternal grandfather, and maternal grandmother.    PRIOR LABS:  Lab Results   Component Value Date    WBC 6.9 04/03/2012    HGB 14.6 04/03/2012    HCT 41.8 04/03/2012    MCV 92 04/03/2012     04/03/2012     09/12/2016    K 4.5 09/12/2016    BUN 11 09/12/2016     Lab Results   Component Value Date    CHOL 158 09/12/2016    HDL 47 09/12/2016    LDLCALC 89 09/12/2016    TRIG 109 09/12/2016     Lab Results   Component Value Date    TSH 0.8 07/22/2013     No components found for: GLUC      RISK BEHAVIORS AND HEALTH HABITS:  Seat Belt Use: YES  Guns: NO  Dental Care: YES  Eye: yearly. Due this year.  Colonoscopy: 2012 normal.      REVIEW OF SYSTEMS:  Complete head to toe review of systems is otherwise negative except as above.    OBJECTIVE:  VITAL SIGNS:  /60 (Patient Site: Right Arm, Patient Position: Sitting, Cuff Size: Adult Regular)  Pulse 60  Resp 12  Ht 5' 9.69\" (1.77 m)  Wt 158 lb 4 oz (71.8 kg)  BMI 22.91 kg/m2  GENERAL:  Patient alert, in NAD  EYES: PERRLA. Extraoccular movements intact, pupils equal, reactive to light and accommodation.  Normal conjunctiva and lids.    ENT:  Hearing grossly normal.  Normal appearance to ears and nose.  Bilateral TM s, external canals, oropharynx normal. Normal lips, gums and teeth.    NECK:  Supple, without thyromegaly or mass.  RESP:  Clear to auscultation without crackles, wheezes or distress.  Normal respiratory effort.   CV:  Regular rate and rhythm without murmurs, rubs or gallops.  Normal pedal pulses.  No varicosities or edema.  ABDOMEN:  Soft, non-tender, without hepatosplenomegaly, masses, or hernias.    :  Normal scrotum.  Penis without lesions or discharge. Prostate is smooth, not enlarged with no nodules.  LYMPHATIC: No cervical lymphadenopathy.  No bruising.  NEURO:  CN II-XII intact, motor & sensory function all intact.  DTR and reflexes normal.  PSYCHIATRIC:  Alert & " oriented with normal mood and affect.  Good judgment and insight.  SKIN:  Normal inspection and palpation.  MUSCULOSKELETAL: Normal gait and station. Back: No CVA or vertebral tenderness.  - Spine / Ribs / Pelvis: Normal inspection, ROM, stability and strength: Spine, Head, Neck, Upper and Lower Extremities.  FEET: Slight peeling of skin.  DP 2+ bilaterally. Sensation intact to monofilament testing bilaterally.      Chinedu was seen today for annual exam, diabetes and medication refill.    Diagnoses and all orders for this visit:    Routine general medical examination at a health care facility  Up to date with colon cancer screening  Sees urology this fall to follow his PSA- elevated in past.    Diabetes mellitus type 2, controlled- Check routine labs. Continue Amaryl.  -     Comprehensive Metabolic Panel  -     Glycosylated Hemoglobin A1c  -     Microalbumin, Random Urine    Vitamin D deficiency  -     Vitamin D, Total (25-Hydroxy)    Hypercholesteremia- Continue Lipitor.  -     Lipid Cascade    Tinea pedis  -     clotrimazole-betamethasone (LOTRISONE) cream; Apply and rub in a thin film to affected areas every morning and evening.    Hypertension- Good blood pressure control on Norvasc/ Lotensin.  Check Shiva/ K.       Joana Mckeon

## 2021-06-12 NOTE — TELEPHONE ENCOUNTER
Patient called stating his right ear has some shooting pain on and off. He was seen on 10/1 by Dr. Palomino for bilateral otitis externa and has used Ciprodex for 10 days. He is asking if he can use Ciprodex for 5 more days. Per Dr. Palomino, patient can start using the Ciprodex again and he would to see him in the clinic. Patient notified and appointment scheduled.    Shaista Ji RN  [facility]  [phone]

## 2021-06-12 NOTE — PROGRESS NOTES
CHIEF COMPLAINT:   Chief Complaint   Patient presents with     Ear Pain     Patient is doing better today with no ear pain present. He did have right side ear pain about 3 days ago.         HISTORY OF PRESENT ILLNESS    Chinedu was seen in follow up for follow-up for otitis externa.  He has been pain-free over the last several days but when he made the appointment he was having some discomfort in both ears.  At this point he has really no ear complaints no drainage no pain.  He does have some chronic itching.  Hearing is stable no dizziness or tinnitus.  Chief Complaint   Patient presents with     Ear Pain     Patient is doing better today with no ear pain present. He did have right side ear pain about 3 days ago.            REVIEW OF SYSTEMS    Review of Systems: a 10-system review is reviewed at this encounter.  See scanned document.     Patient has no known allergies.     PHYSICAL EXAM:        HEAD: Normal appearance and symmetry:  No cutaneous lesions.      EARS:   Auricles normal  EACs healthy, TMs intact, nl         NOSE:    Dorsum:   straight       ORAL CAVITY/OROPHARYNX:    Lips:  Normal.     NECK:  Trachea:  midline       NEURO:   Alert and Oriented    GAIT AND STATION:  normal     RESPIRATORY:   Symmetry and Respiratory effort         IMPRESSION:    Encounter Diagnoses   Name Primary?     Itching of ear Yes     Infective otitis externa, bilateral (resolved)           RECOMMENDATIONS:      No orders of the defined types were placed in this encounter.     Medications Ordered   Medications     fluocinonide (LIDEX) 0.05 % cream     Sig: Apply small amount to affected ear daily for itching     Dispense:  30 g     Refill:  1      Pressures him on how to use the Q-tip with 90 degree angle so that he does not damage the eardrum.  He will return in 6 months for repeat check.  All questions were answered.  He is agreeable to this plan of care.  To keep the ears dry.  He can use the Lidex cream daily as needed for  itching..

## 2021-06-13 NOTE — TELEPHONE ENCOUNTER
Rx refill request: Gimepiride. Order pend for refill. Please review and sign.   Last virtual visit: 7/22/20  Last refill: 6/16/20

## 2021-06-15 ENCOUNTER — COMMUNICATION - HEALTHEAST (OUTPATIENT)
Dept: FAMILY MEDICINE | Facility: CLINIC | Age: 76
End: 2021-06-15

## 2021-06-15 NOTE — TELEPHONE ENCOUNTER
RN cannot approve Refill Request    RN can NOT refill this medication Protocol failed and NO refill given. Last office visit: 8/13/2018 Joana Mckeon MD Last Physical: 9/4/2019 Last MTM visit: Visit date not found Last visit same specialty: 8/13/2018 Joana Mckeon MD.  Next visit within 3 mo: Visit date not found  Next physical within 3 mo: Visit date not found      Dell Pickard, [facility] Triage/Med Refill 3/5/2021    Requested Prescriptions   Pending Prescriptions Disp Refills     glimepiride (AMARYL) 2 MG tablet [Pharmacy Med Name: GLIMEPIRIDE 2MG TABLETS] 180 tablet 0     Sig: TAKE 2 TABLETS(4 MG) BY MOUTH DAILY       Oral Hypoglycemics Refill Protocol Failed - 3/5/2021  3:53 AM        Failed - Visit with PCP or prescribing provider visit in last 6 months       Last office visit with prescriber/PCP: Visit date not found OR same dept: Visit date not found OR same specialty: 8/13/2018 Joana Mckeon MD Last physical: Visit date not found Last MTM visit: Visit date not found         Next appt within 3 mo: Visit date not found  Next physical within 3 mo: Visit date not found  Prescriber OR PCP: Joana Mckeon MD  Last diagnosis associated with med order: 1. Type 2 diabetes mellitus (H)  - glimepiride (AMARYL) 2 MG tablet [Pharmacy Med Name: GLIMEPIRIDE 2MG TABLETS]; TAKE 2 TABLETS(4 MG) BY MOUTH DAILY  Dispense: 180 tablet; Refill: 0     If protocol passes may refill for 12 months if within 3 months of last provider visit (or a total of 15 months).           Failed - A1C in last 6 months     Hemoglobin A1c   Date Value Ref Range Status   07/27/2020 6.4 (H) 3.5 - 6.0 % Final               Passed - Microalbumin in last year      Microalbumin, Random Urine   Date Value Ref Range Status   07/27/2020 1.24 0.00 - 1.99 mg/dL Final                  Passed - Blood pressure in last year     BP Readings from Last 1 Encounters:   09/21/20 134/64             Passed - Serum  creatinine in last year     Creatinine   Date Value Ref Range Status   07/27/2020 0.98 0.70 - 1.30 mg/dL Final

## 2021-06-16 PROBLEM — B35.3 TINEA PEDIS OF BOTH FEET: Status: ACTIVE | Noted: 2019-09-04

## 2021-06-16 NOTE — PROGRESS NOTES
Assessment and Plan:     Patient has been advised of split billing requirements and indicates understanding: Yes  Problem List Items Addressed This Visit     Vitamin D Deficiency    Relevant Orders    Vitamin D, Total (25-Hydroxy)    Diabetes mellitus type 2, controlled (H)    Relevant Orders    Glycosylated Hemoglobin A1c (Completed)    Comprehensive Metabolic Panel    Lipid Cascade    Microalbumin, Random Urine      Other Visit Diagnoses     Routine general medical examination at a health care facility    -  Primary    Relevant Orders    Td, Preservative Free (green label) (Completed)  Colonoscopy due in 2023.  Sees urology for his PSA screening.  Consider Shingrix at pharmacy.    Screen for STD (sexually transmitted disease)        Relevant Orders    Hepatitis C Antibody (Anti-HCV)            The patient's current medical problems were reviewed.    I have had an Advance Directives discussion with the patient.  The following health maintenance schedule was reviewed with the patient and provided in printed form in the after visit summary:   Health Maintenance Due   Topic Date Due     HEPATITIS C SCREENING  Never done     DIABETIC FOOT EXAM  Never done     ZOSTER VACCINES (1 of 2) Never done        Subjective:   Chief Complaint: Chinedu Cifuentes is an 75 y.o. male here for an Annual Wellness visit.   HPI:  1) diabetes mellitus type 2 - He is on oral meds. BS around 104 with occasional 130.  He lost weight due to stopping alcohol.  He is taking Amaryl.  Had eye exam with no retinopathy.  He wonders about taking asa - was on in past then stopped.  2) BP has been well controlled.     Review of Systems:    Please see above.  The rest of the review of systems are negative for all systems.    Patient Care Team:  Joana Mckeon MD as PCP - General  Joana Mckeon MD as Assigned PCP  Teddy Palomino MD as Assigned Surgical Provider     Patient Active Problem List   Diagnosis     Benign Essential  Hypertension     Diabetes mellitus type 2, controlled (H)     Adenomatous polyp of colon, unspecified part of colon     Vitamin D Deficiency     Hypercholesteremia     Actinic Keratosis     Serology Prostate-specific Antigen (PSA) Elevated     Tinea pedis of both feet     No past medical history on file.   No past surgical history on file.   Family History   Problem Relation Age of Onset     Hyperlipidemia Mother      Lung disease Mother      Depression Mother      Diabetes type II Father      Hypertension Father      Hyperlipidemia Father      Diabetes type II Sister      Brain cancer Sister      Diabetes type II Brother      Hyperlipidemia Brother      No Medical Problems Maternal Grandmother      No Medical Problems Maternal Grandfather      Dementia Paternal Grandmother      Colon cancer Paternal Grandfather 97     No Medical Problems Daughter      No Medical Problems Daughter      No Medical Problems Daughter       Social History     Socioeconomic History     Marital status:      Spouse name: Not on file     Number of children: Not on file     Years of education: Not on file     Highest education level: Not on file   Occupational History     Not on file   Social Needs     Financial resource strain: Not on file     Food insecurity     Worry: Not on file     Inability: Not on file     Transportation needs     Medical: Not on file     Non-medical: Not on file   Tobacco Use     Smoking status: Never Smoker     Smokeless tobacco: Never Used   Substance and Sexual Activity     Alcohol use: Yes     Alcohol/week: 7.0 standard drinks     Types: 7 Glasses of wine per week     Drug use: No     Sexual activity: Not on file   Lifestyle     Physical activity     Days per week: Not on file     Minutes per session: Not on file     Stress: Not on file   Relationships     Social connections     Talks on phone: Not on file     Gets together: Not on file     Attends Mu-ism service: Not on file     Active member of club  or organization: Not on file     Attends meetings of clubs or organizations: Not on file     Relationship status: Not on file     Intimate partner violence     Fear of current or ex partner: Not on file     Emotionally abused: Not on file     Physically abused: Not on file     Forced sexual activity: Not on file   Other Topics Concern     Not on file   Social History Narrative     Not on file      Current Outpatient Medications   Medication Sig Dispense Refill     amLODIPine (NORVASC) 2.5 MG tablet TAKE 1 TABLET BY MOUTH EVERY DAY 90 tablet 3     atorvastatin (LIPITOR) 10 MG tablet TAKE 1 TABLET(10 MG) BY MOUTH DAILY 90 tablet 2     benazepriL (LOTENSIN) 10 MG tablet TAKE 1 TABLET(10 MG) BY MOUTH DAILY 90 tablet 3     blood glucose meter (GLUCOMETER) Dispense glucometer brand per patient's insurance at pharmacy discretion. 1 each 0     blood glucose test strips Use 1 each As Directed as needed. Dispense brand per patient's insurance at pharmacy discretion. 100 each 11     CALCIUM CITRATE/VITAMIN D3 (CITRACAL REGULAR ORAL) Take 1 tablet by mouth daily.        ciprofloxacin-dexamethasone (CIPRODEX) otic suspension 4 drops in affected ear twice daily x 5 days 7.5 mL 0     clotrimazole-betamethasone (LOTRISONE) cream APPLY AND RUB THIN LAYER EXTERNALLY TO THE AFFECTED AREA EVERY MORNING AND EVERY EVENING 30 g 0     ergocalciferol (ERGOCALCIFEROL) 1,250 mcg (50,000 unit) capsule TAKE ONE CAPSULE BY MOUTH EVERY 2 WEEKS 6 capsule 3     ergocalciferol (ERGOCALCIFEROL) 50,000 unit capsule TAKE ONE CAPSULE BY MOUTH EVERY 2 WEEKS 6 capsule 3     ergocalciferol (ERGOCALCIFEROL) 50,000 unit capsule TAKE ONE CAPSULE BY MOUTH EVERY 2 WEEKS 6 capsule 3     fluocinonide (LIDEX) 0.05 % cream Apply small amount to affected ear daily for itching 30 g 1     generic lancets Use 1 each As Directed as needed. Dispense brand per patient's insurance at pharmacy discretion. 100 each 11     glimepiride (AMARYL) 2 MG tablet TAKE 2 TABLETS(4 MG)  "BY MOUTH DAILY 180 tablet 0     UBIDECARENONE (COQ-10 ORAL) Take 1 tablet by mouth every other day.        aspirin 81 mg TbEF Take 1 tablet by mouth daily.       No current facility-administered medications for this visit.       Objective:   Vital Signs:   Visit Vitals  /68 (Patient Site: Left Arm, Patient Position: Sitting, Cuff Size: Adult Regular)   Pulse (!) 56   Temp 97.7  F (36.5  C) (Oral)   Resp 16   Ht 5' 9.5\" (1.765 m)   Wt 149 lb 9 oz (67.8 kg)   SpO2 100%   BMI 21.77 kg/m           VisionScreening:  No exam data present     PHYSICAL EXAM  /68 (Patient Site: Left Arm, Patient Position: Sitting, Cuff Size: Adult Regular)   Pulse (!) 56   Temp 97.7  F (36.5  C) (Oral)   Resp 16   Ht 5' 9.5\" (1.765 m)   Wt 149 lb 9 oz (67.8 kg)   SpO2 100%   BMI 21.77 kg/m    No acute distress  HEENT: Head atraumatic / normocephalic.  PERRL.  Conjunctiva clear. Nose with no discharge.  Tympanic membranes grey with normal landmarks.  OP - pink and moist.  Normal dentition.  Neck: Supple.  No lymphadenopathy or thyromegaly.  Lungs: CTA.  No retractions or tachypnea.  CV: RRR. S1 and S2 normal.  No murmurs / rubs / gallops.  No lower extremity edema.    Abdomen: Soft. Non tender. Non distended.  No HSM or masses.  Skin: No rashes or lesions.  Neuro: AAOx3.  Normal strength and tone.  Normal gait.  DTRs in lower extremities equal bilaterally.  Psych: Mood and affect normal.  Good eye contact.  Normal speech.     Assessment Results 3/22/2021   Activities of Daily Living No help needed   Instrumental Activities of Daily Living No help needed   Get Up and Go Score Less than 12 seconds   Mini Cog Total Score 5   Some recent data might be hidden     A Mini-Cog score of 0-2 suggests the possibility of dementia, score of 3-5 suggests no dementia    Identified Health Risks:     The patient was counseled and encouraged to consider modifying their diet and eating habits. He was provided with information on recommended " healthy diet options.  Patient's advanced directive was discussed and I am comfortable with the patient's wishes.    Joana Mckeon

## 2021-06-17 NOTE — TELEPHONE ENCOUNTER
Refill Approved    Rx renewed per Medication Renewal Policy. Medication was last renewed on 3/5/21.    D[facility] Pickard, [facility] Triage/Med Refill 5/24/2021     Requested Prescriptions   Pending Prescriptions Disp Refills     glimepiride (AMARYL) 2 MG tablet [Pharmacy Med Name: GLIMEPIRIDE 2MG TABLETS] 180 tablet 0     Sig: TAKE 2 TABLETS(4 MG) BY MOUTH DAILY       Oral Hypoglycemics Refill Protocol Passed - 5/23/2021  8:10 AM        Passed - Visit with PCP or prescribing provider visit in last 6 months       Last office visit with prescriber/PCP: Visit date not found OR same dept: Visit date not found OR same specialty: 8/13/2018 Joana Mckeon MD Last physical: 3/22/2021 Last MTM visit: Visit date not found         Next appt within 3 mo: Visit date not found  Next physical within 3 mo: Visit date not found  Prescriber OR PCP: Joana Mckeon MD  Last diagnosis associated with med order: 1. Type 2 diabetes mellitus (H)  - glimepiride (AMARYL) 2 MG tablet [Pharmacy Med Name: GLIMEPIRIDE 2MG TABLETS]; TAKE 2 TABLETS(4 MG) BY MOUTH DAILY  Dispense: 180 tablet; Refill: 0     If protocol passes may refill for 12 months if within 3 months of last provider visit (or a total of 15 months).           Passed - A1C in last 6 months     Hemoglobin A1c   Date Value Ref Range Status   03/22/2021 6.9 (H) <=5.6 % Final               Passed - Microalbumin in last year      Microalbumin, Random Urine   Date Value Ref Range Status   03/22/2021 0.59 0.00 - 1.99 mg/dL Final                  Passed - Blood pressure in last year     BP Readings from Last 1 Encounters:   03/22/21 140/68             Passed - Serum creatinine in last year     Creatinine   Date Value Ref Range Status   03/22/2021 0.93 0.70 - 1.30 mg/dL Final

## 2021-06-17 NOTE — PATIENT INSTRUCTIONS - HE
Patient Instructions by Joana Mckeon MD at 9/4/2019  8:40 AM     Author: Joana Mckeon MD Service: -- Author Type: Physician    Filed: 9/4/2019  8:33 AM Encounter Date: 9/4/2019 Status: Signed    : Joana Mckeon MD (Physician)         Patient Education   Signs of Hearing Loss  Hearing loss is a problem shared by many people. In fact, it is one of the most common health conditions, particularly as people age. Most people over age 65 have some hearing loss, and by age 80, almost everyone does. Because hearing loss usually occurs slowly over the years, you may not realize your hearing ability has gotten worse.       Have your hearing checked  Contact your [facility] care provider if you:    Have to strain to hear normal conversation.    Have to watch other peoples faces very carefully to follow what theyre saying.    Need to ask people to repeat what theyve said.    Often misunderstand what people are saying.    Turn the volume of the television or radio up so high that others complain.    Feel that people are mumbling when theyre talking to you.    Find that the effort to hear leaves you feeling tired and irritated.    Notice, when using the phone, that you hear better with 1 ear than the other.    0556-5567 [employer]. [address] [place] [address], [place] [address]. All rights reserved. This information is not intended as a substitute for professional medical care. Always follow your healthcare professional's instructions.           Advance Directive  Patients advance directive was discussed and I am comfortable with the patients wishes.  Patient Education   Personalized Prevention Plan  You are due for the preventive services outlined below.  Your care team is available to assist you in scheduling these services.  If you have already completed any of these items, please share that information with your care team to update in your medical record.  Health Maintenance    Topic Date Due   ? DIABETES FOOT EXAM  08/16/1955   ? ZOSTER VACCINES (1 of 2) 08/16/1993   ? ADVANCE CARE PLANNING  07/24/2017   ? DIABETES FOLLOW-UP  02/11/2018   ? MEDICARE ANNUAL WELLNESS VISIT  08/11/2018   ? DIABETES HEMOGLOBIN A1C  02/13/2019   ? INFLUENZA VACCINE RULE BASED (1) 08/01/2019   ? DIABETES URINE MICROALBUMIN  08/13/2019   ? FALL RISK ASSESSMENT  08/13/2019   ? DIABETES OPHTHALMOLOGY EXAM  05/23/2020   ? TD 18+ HE  10/27/2020   ? PNEUMOCOCCAL POLYSACCHARIDE VACCINE AGE 65 AND OVER  Completed   ? PNEUMOCOCCAL CONJUGATE VACCINE FOR ADULTS (PCV13 OR PREVNAR)  Completed

## 2021-06-18 NOTE — PATIENT INSTRUCTIONS - HE
"Patient Instructions by Teddy Palomino MD at 10/9/2020 11:00 AM     Author: Teddy Palomino MD Service: -- Author Type: Physician    Filed: 10/9/2020 11:05 AM Encounter Date: 10/9/2020 Status: Signed    : Teddy Palomino MD (Physician)           Bend wire to 90 degrees so that the \"elbow) is just at the point where the cotton swab begins.  (this will prevent you from putting the cotton tip in too far and damaging the eardrum)  Apply small amount of steroid cream to cotton tip.  The cream is potent so you only need a small dab on the swab.        "

## 2021-06-18 NOTE — PATIENT INSTRUCTIONS - HE
Patient Instructions by Joana Mckeon MD at 3/22/2021  9:00 AM     Author: Joana Mckeon MD Service: -- Author Type: Physician    Filed: 3/22/2021  9:17 AM Encounter Date: 3/22/2021 Status: Signed    : Joana Mckeon MD (Physician)         Patient Education   Understanding USDA MyPlate  The USDA (US Department of Agriculture) has guidelines to help you make healthy food choices. These are called MyPlate. MyPlate shows the food groups that make up healthy meals using the image of a place setting. Before you eat, think about the healthiest choices for what to put onto your plate or into your cup or bowl. To learn more about building a healthy plate, visit www.choosemyplate.gov.       The Food Groups    Fruits: Any fruit or 100% fruit juice counts as part of the Fruit Group. Fruits may be fresh, canned, frozen, or dried, and may be whole, cut-up, or pureed. Make half your plate fruits and vegetables.    Vegetables: Any vegetable or 100% vegetable juice counts as a member of the Vegetable Group. Vegetables may be fresh, frozen, canned, or dried. They can be served raw or cooked and may be whole, cut-up, or mashed. Make half your plate fruits and vegetables.     Grains: All foods made from grains are part of the Grains Group. These include wheat, rice, oats, cornmeal, and barley such as bread, pasta, oatmeal, cereal, tortillas, and grits. Grains should be no more than a quarter of your plate. At least half of your grains should be whole grains.    Protein: This group includes meat, poultry, seafood, beans and peas, eggs, processed soy products (like tofu), nuts (including nut butters), and seeds. Make protein choices no more than a quarter of your plate. Meat and poultry choices should be lean or low fat.    Dairy: All fluid milk products and foods made from milk that contain calcium, like yogurt and cheese are part of the Dairy Group. (Foods that have little calcium, such as  cream, butter, and cream cheese, are not part of the group.) Most dairy choices should be low-fat or fat-free.    Oils: These are fats that are liquid at room temperature. They include canola, corn, olive, soybean, and sunflower oil. Foods that are mainly oil include mayonnaise, certain salad dressings, and soft margarines. You should have only 5 to 7 teaspoons of oils a day. You probably already get this much from the food you eat.  Use [employer] to Help Build Your Meals  The Bring[employer] can help you plan and track your meals and activity. You can look up individual foods to see or compare their nutritional value. You can get guidelines for what and how much you should eat. You can compare your food choices. And you can assess personal physical activities and see ways you can improve. Go to www.[employer].gov/Muzookacker/.    7658-3821 The [employer]. [address] [place] [address]. All rights reserved. This information is not intended as a substitute for professional medical care. Always follow your healthcare professional's instructions.             Advance Directive  Patients advance directive was discussed and I am comfortable with the patients wishes.  Patient Education   Personalized Prevention Plan  You are due for the preventive services outlined below.  Your care team is available to assist you in scheduling these services.  If you have already completed any of these items, please share that information with your care team to update in your medical record.  Health Maintenance Due   Topic Date Due   ? HEPATITIS C SCREENING  Never done   ? DIABETIC FOOT EXAM  Never done   ? ZOSTER VACCINES (1 of 2) Never done   ? TD 18+ HE  10/27/2020

## 2021-06-19 NOTE — LETTER
Letter by Arielle Mccann RN at      Author: Arielle Mccann, RN Service: -- Author Type: --    Filed:  Encounter Date: 11/1/2019 Status: Signed       Honoring Choices Advance Care Planning  [facility]  [address] [place] [address]            Chinedu Cifuentes  [address]  [place] [address]    Dear Chinedu    We have reviewed the Health Care Directive dated 10-26-19 which you presented for addition to your medical record. Unfortunately, our review indicates the document is not legally valid for   the following reason(s): The document is missing page(s) 6.  In order to create a legal document you will need to send us the missing page of the document. Our apologies if we made an error in copying the document.     We greatly value the opportunity to assist you in documenting your choices and to honor your   wishes. We apologize for any inconvenience. We have several options to assist you in updating   your document so it meets legal requirements.       Health Care Directives and Advance Care Planning resources can be viewed and printed   for free at our web site:[email or web address].       Free group classes on Advance Care Planning and completing a Health Care Directive are  available at multiple locations and times. These classes are led by trained staff who will   provide information and guide you through a Health Care Directive. They can also review, notarize and add your Health Care Directive to your medical record. [place] for a class at [email or web address] or by calling [facility] at [phone] or toll free [phone].      COPIES of completed Health Care Directives can be brought or mailed to any of our   locations, including the address listed below. You can also email a copy to [email or web address] .       For additional assistance or questions you can email us at [email or web address] or call  [phone]      Sincerely,     Mario Cedeno Advance Care Planning  [facility], [facility] and [facility]  [address] [place] [address]   Email us: [email or web address] Call us: [phone]  Visit at: [email or web address]

## 2021-06-19 NOTE — PROGRESS NOTES
"SUBJECTIVE: Chinedu Cifuentes is a 72 y.o. male with   Chief Complaint   Patient presents with     Diabetes     f/u    1) diabetes mellitus type 2 - His blood sugars have been in 70s last few weeks.  Has lost some weight after [place] from being [place]. He is taking Amaryl 4 mg daily.  No low blood sugars. Vision ok.  No foot problems except for itching in last week on bottom of feet.  He walks 1 hour / day.    2) elevated cholesterol - Taking Lipitor 10 mg daily with no side effects.  He is also on Co-Q 10.    3) Vitamin D deficiency - Taking 00171 IU once a month and 1000 IU daily.    4) HTN - BP well controlled on amlodipine / benazepril.    5) He has had itchy soles.  No obvious rash.  Has not tried any treatment.    ROS: No shortness of breath / chest pain / digestive issues / mood issues.  SH: .  Retired.  Travels extensively and watches twin grandchildren in [place].  Patient Active Problem List   Diagnosis     Pain During Urination (Dysuria)     Benign Essential Hypertension     Diabetes mellitus type 2, controlled (H)     Benign Polyps Of The Large Intestine     Vitamin D Deficiency     Hypercholesteremia     Skin: A Rash     Actinic Keratosis     Serology Prostate-specific Antigen (PSA) Elevated         OBJECTIVE: /60 (Patient Site: Left Arm, Patient Position: Sitting, Cuff Size: Adult Regular)  Pulse (!) 57  Ht 5' 9.75\" (1.772 m)  Wt 154 lb (69.9 kg)  SpO2 99%  BMI 22.26 kg/m2   No acute distress.  HEENT: Head atraumatic/normocephalic.  PERRL.  Conjunctiva clear.  Tympanic membranes grey with normal landmarks and normal light reflexes.  No nasal discharge.  Oropharynx is pink and moist.  Sinuses nontender.  Neck: Supple.  No lymphadenopathy or thyromegaly.  Lungs: Clear to auscultation.  No wheezing, retractions, or tachypnea.  Heart: RRR. S1 and S2 normal.  No murmurs, rubs, or gallops.  Neuro: Awake, alert, oriented x 3.  Normal strength and tone.  Normal gait.  lower extremities: No " edema.  DP 2+ bilaterally.  Sensation intact to monofilament testing.  Soles dry with fine flakes skin and some macerated tissue between toes.    Wt Readings from Last 3 Encounters:   08/13/18 154 lb (69.9 kg)   08/11/17 158 lb 4 oz (71.8 kg)   09/12/16 159 lb (72.1 kg)       Lab Results   Component Value Date    HGBA1C 7.2 (H) 08/13/2018     Chinedu was seen today for diabetes.    Diagnoses and all orders for this visit:    Diabetes mellitus type 2, controlled (H)- Well controlled.  Continue current med.  Recommend he consider some weight training.  -     Comprehensive Metabolic Panel  -     Glycosylated Hemoglobin A1c  -     Microalbumin, Random Urine    Hypercholesteremia- Check lipid on statin.  -     Lipid Cascade    Vitamin D deficiency  -     Vitamin D, Total (25-Hydroxy)    Benign neoplasm of colon- Due for repeat colonoscopy.  -     Ambulatory referral for Colonoscopy    Tinea pedis of both feet- He may also have some eczema so will treat with Lotrisone which he has at home.    Patient Instructions   Consider annual wellness visit in future    You will be contacted for colonoscopy  [facility]   Phone: [phone]    Consider weight training 2-3x a week - Can make appointment with [facility] or local gym to meet with a .    Consider [employer] - 7 minute  Work-out    Check into new shingles vaccine - Shingrix    Start using Lotrimin cream on feet at night until itching resolved    Monitor for low blood sugars          Joana Mckeon

## 2021-06-25 NOTE — TELEPHONE ENCOUNTER
Pt's on BP quality list. Last BP was 140/68 on 3/22/21. Pt is not home and will check Bp and call back with readings. He will be back home on after 7/10 and will call for appointment when he is back. xl

## 2021-06-26 ENCOUNTER — HEALTH MAINTENANCE LETTER (OUTPATIENT)
Age: 76
End: 2021-06-26

## 2021-06-29 NOTE — PROGRESS NOTES
"Progress Notes by Lamar Boyle AuD at 10/1/2020 10:40 AM     Author: Lamar Boyle AuD Service: -- Author Type: Audiologist    Filed: 10/1/2020 11:33 AM Encounter Date: 10/1/2020 Status: Signed    : Lamar Boyle AuD (Audiologist)       Hearing evaluation in conjunction with ENT exam (Dr. Palomino)    Summary:  Audiology visit completed. Please see audiogram below or under \"media\" tab for history and results.    Transducer:  Both insert phones and circumaural headphones were used.    Reliability:    Good    Recommendations:  Follow-up with ENT; retest hearing annually (to monitor) or per medical management/patient concern.  Wear hearing protection consistently in noise to preserve residual hearing sensitivity and to minimize the effects of tinnitus.  Chinedu Cifuentes is a potential binaural amplification candidate, if patient motivation exists and medical clearance is granted. Common tinnitus triggers and management strategies were discussed at length, and several patient questions regarding the cleaning of his ears were discussed. Mr. Cifuentes expressed verbal understanding of this information and plan.      Evi Hamilton, [facility]  [place] Licensed Audiologist [id number]           "

## 2021-07-11 ENCOUNTER — OFFICE VISIT (OUTPATIENT)
Dept: FAMILY MEDICINE | Facility: CLINIC | Age: 76
End: 2021-07-11
Payer: MEDICARE

## 2021-07-11 VITALS
WEIGHT: 155 LBS | BODY MASS INDEX: 22.56 KG/M2 | DIASTOLIC BLOOD PRESSURE: 64 MMHG | OXYGEN SATURATION: 98 % | SYSTOLIC BLOOD PRESSURE: 137 MMHG | HEART RATE: 66 BPM | TEMPERATURE: 98.4 F

## 2021-07-11 DIAGNOSIS — H61.23 BILATERAL IMPACTED CERUMEN: Primary | ICD-10-CM

## 2021-07-11 PROCEDURE — 69209 REMOVE IMPACTED EAR WAX UNI: CPT | Performed by: FAMILY MEDICINE

## 2021-07-11 NOTE — PROGRESS NOTES
Assessment:       1. Bilateral impacted cerumen         Plan:     Patient with bilateral impacted cerumen, successfully removed with ear lavage.  Patient had completely relief of his symptoms.  Follow-up if symptoms getting worse.      There are no Patient Instructions on file for this visit.    Subjective:       75 year old male with a history of cerumen impaction in the past presents for evaluation bilateral feeling of ear fullness and muffled hearing, left greater than right.  Symptoms started a couple weeks ago and has seemed to have gotten worse.  He denies any ear pain or drainage.  No other complaints or concerns today.    Patient Active Problem List   Diagnosis     Benign Essential Hypertension     Diabetes mellitus type 2, controlled (H)     Adenomatous polyp of colon, unspecified part of colon     Vitamin D Deficiency     Hypercholesteremia     Actinic Keratosis     Serology Prostate-specific Antigen (PSA) Elevated     Tinea pedis of both feet       No past medical history on file.    No past surgical history on file.    Current Outpatient Medications   Medication     amLODIPine (NORVASC) 2.5 MG tablet     aspirin (ASA) 81 MG EC tablet     atorvastatin (LIPITOR) 10 MG tablet     benazepriL (LOTENSIN) 10 MG tablet     blood glucose meter (GLUCOMETER)     blood glucose test strips     CALCIUM CITRATE/VITAMIN D3 (CITRACAL REGULAR ORAL)     ciprofloxacin-dexamethasone (CIPRODEX) otic suspension     clotrimazole-betamethasone (LOTRISONE) cream     ergocalciferol (ERGOCALCIFEROL) 1,250 mcg (50,000 unit) capsule     ergocalciferol (ERGOCALCIFEROL) 50,000 unit capsule     ergocalciferol (ERGOCALCIFEROL) 50,000 unit capsule     fluocinonide (LIDEX) 0.05 % cream     generic lancets     glimepiride (AMARYL) 2 MG tablet     MEDICATION CANNOT BE REORDERED - PLEASE MANUALLY REORDER AND DISCONTINUE THE OLD ORDER     UBIDECARENONE (COQ-10 ORAL)     No current facility-administered medications for this visit.       No  Known Allergies    Family History   Problem Relation Age of Onset     Hyperlipidemia Mother      LUNG DISEASE Mother      Depression Mother      Diabetes Type 2  Father      Hypertension Father      Hyperlipidemia Father      Diabetes Type 2  Sister      Brain Cancer Sister      Diabetes Type 2  Brother      Hyperlipidemia Brother      No Known Problems Maternal Grandmother      No Known Problems Maternal Grandfather      Dementia Paternal Grandmother      Colon Cancer Paternal Grandfather 97.00     No Known Problems Daughter      No Known Problems Daughter      No Known Problems Daughter        Social History     Socioeconomic History     Marital status:      Spouse name: None     Number of children: None     Years of education: None     Highest education level: None   Occupational History     None   Tobacco Use     Smoking status: Never Smoker     Smokeless tobacco: Never Used   Substance and Sexual Activity     Alcohol use: Yes     Alcohol/week: 7.0 standard drinks     Drug use: No     Sexual activity: None   Other Topics Concern     None   Social History Narrative     None     Social Determinants of Health     Financial Resource Strain:      Difficulty of Paying Living Expenses:    Food Insecurity:      Worried About Running Out of Food in the Last Year:      Ran Out of Food in the Last Year:    Transportation Needs:      Lack of Transportation (Medical):      Lack of Transportation (Non-Medical):    Physical Activity:      Days of Exercise per Week:      Minutes of Exercise per Session:    Stress:      Feeling of Stress :    Social Connections:      Frequency of Communication with Friends and Family:      Frequency of Social Gatherings with Friends and Family:      Attends Hindu Services:      Active Member of Clubs or Organizations:      Attends Club or Organization Meetings:      Marital Status:    Intimate Partner Violence:      Fear of Current or Ex-Partner:      Emotionally Abused:       Physically Abused:      Sexually Abused:          Review of Systems  A comprehensive review of systems was negative.      Objective:     /64 (BP Location: Right arm, Patient Position: Sitting, Cuff Size: Adult Regular)   Pulse 66   Temp 98.4  F (36.9  C) (Oral)   Wt 70.3 kg (155 lb)   SpO2 98%   BMI 22.56 kg/m       General appearance: alert, appears stated age and cooperative  Ears: Bilateral cerumen impaction noted.  Ear lavage performed by support staff and ears reexamined.  Ear canals now clear and tympanic membranes normal.  Patient with relief of his symptoms.      This note has been dictated using voice recognition software. Any grammatical or context distortions are unintentional and inherent to the software

## 2021-07-13 ENCOUNTER — TELEPHONE (OUTPATIENT)
Dept: FAMILY MEDICINE | Facility: CLINIC | Age: 76
End: 2021-07-13

## 2021-07-13 NOTE — TELEPHONE ENCOUNTER
Forms Request  Name of form/paperwork: Application for new social security card  Have you been seen for this request: Yes  Do we have the form: mailbox  When is form needed by:ASAP  How would you like the form returned: Mailed to the address listed  Patient Notified form requests are processed in 3-5 business days: Yes    Okay to leave a detailed message? Yes    Sheela and her  Chinedu need a certified medical report printed off for both of them and mailed in with their applications for a replacement social security card. It must come from her doctors office and it must be sealed and labeled with our signa. Please mail it to [address] [facility] [address]    Sheela Carroll MRN [id number]

## 2021-07-14 NOTE — TELEPHONE ENCOUNTER
Pt's wife is calling back. 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 On the letter it needs to say Dr. Mckeon's full name printed out with a physical signature and below that job title, address of clinic and phone number. This needs to be on letterhead.     Patient information needed on the letter is full name of patient,  and address.     Pt will be brining in a copy of the form needed by  in addition to this letter to the clinic on 7/15/21.     They need to get this done so they can get new social security cards to then get the real ID.     This is for the patient and wife. Same note has been added to her chart.

## 2021-09-10 DIAGNOSIS — I10 ESSENTIAL HYPERTENSION, BENIGN: ICD-10-CM

## 2021-09-10 RX ORDER — AMLODIPINE BESYLATE 2.5 MG/1
TABLET ORAL
Qty: 90 TABLET | Refills: 3 | Status: SHIPPED | OUTPATIENT
Start: 2021-09-10 | End: 2022-08-17

## 2021-09-10 RX ORDER — ATORVASTATIN CALCIUM 10 MG/1
TABLET, FILM COATED ORAL
Qty: 90 TABLET | Refills: 3 | Status: SHIPPED | OUTPATIENT
Start: 2021-09-10 | End: 2024-03-20

## 2021-09-10 NOTE — TELEPHONE ENCOUNTER
"Lipitor:   Last Written Prescription Date:  12/18/2020  Last Fill Quantity: 90,  # refills: 2   Last office visit provider:  7/11/21    Norvasc:   Last Written Prescription Date:  9/10/2020  Last Fill Quantity: 90,  # refills: 3   Last office visit provider:  7/11/21     Requested Prescriptions   Pending Prescriptions Disp Refills     atorvastatin (LIPITOR) 10 MG tablet [Pharmacy Med Name: ATORVASTATIN 10MG TABLETS] 90 tablet 2     Sig: TAKE 1 TABLET(10 MG) BY MOUTH DAILY       Statins Protocol Passed - 9/10/2021  3:53 AM        Passed - LDL on file in past 12 months     Recent Labs   Lab Test 03/22/21  0851   LDL 75             Passed - No abnormal creatine kinase in past 12 months     No lab results found.             Passed - Recent (12 mo) or future (30 days) visit within the authorizing provider's specialty     Patient has had an office visit with the authorizing provider or a provider within the authorizing providers department within the previous 12 mos or has a future within next 30 days. See \"Patient Info\" tab in [employer], or \"Choose Columns\" in Meds & Orders section of the refill encounter.              Passed - Medication is active on med list        Passed - Patient is age 18 or older           amLODIPine (NORVASC) 2.5 MG tablet [Pharmacy Med Name: AMLODIPINE BESYLATE 2.5MG TABLETS] 90 tablet 3     Sig: TAKE 1 TABLET BY MOUTH EVERY DAY       Calcium Channel Blockers Protocol  Passed - 9/10/2021  3:53 AM        Passed - Blood pressure under 140/90 in past 12 months     BP Readings from Last 3 Encounters:   07/11/21 137/64   03/22/21 (!) 140/68   09/21/20 134/64                 Passed - Recent (12 mo) or future (30 days) visit within the authorizing provider's specialty     Patient has had an office visit with the authorizing provider or a provider within the authorizing providers department within the previous 12 mos or has a future within next 30 days. See \"Patient Info\" tab in [employer], or \"Choose " "Columns\" in Meds & Orders section of the refill encounter.              Passed - Medication is active on med list        Passed - Patient is age 18 or older        Passed - Normal serum creatinine on file in past 12 months     Recent Labs   Lab Test 03/22/21  0851   CR 0.93       Ok to refill medication if creatinine is low               Doug Greenwood RN 09/10/21 2:44 PM  "

## 2021-09-13 DIAGNOSIS — I10 ESSENTIAL HYPERTENSION, BENIGN: ICD-10-CM

## 2021-09-13 RX ORDER — BENAZEPRIL HYDROCHLORIDE 10 MG/1
TABLET ORAL
Qty: 90 TABLET | Refills: 2 | Status: SHIPPED | OUTPATIENT
Start: 2021-09-13 | End: 2022-05-18

## 2021-09-13 NOTE — TELEPHONE ENCOUNTER
"Last Written Prescription Date:  9/10/20  Last Fill Quantity: 90,  # refills: 3   Last office visit provider:  3/22/21     Requested Prescriptions   Pending Prescriptions Disp Refills     benazepril (LOTENSIN) 10 MG tablet [Pharmacy Med Name: BENAZEPRIL 10MG TABLETS] 90 tablet 3     Sig: TAKE 1 TABLET(10 MG) BY MOUTH DAILY       ACE Inhibitors (Including Combos) Protocol Passed - 9/13/2021  3:54 AM        Passed - Blood pressure under 140/90 in past 12 months     BP Readings from Last 3 Encounters:   07/11/21 137/64   03/22/21 (!) 140/68   09/21/20 134/64                 Passed - Recent (12 mo) or future (30 days) visit within the authorizing provider's specialty     Patient has had an office visit with the authorizing provider or a provider within the authorizing providers department within the previous 12 mos or has a future within next 30 days. See \"Patient Info\" tab in inbasket, or \"Choose Columns\" in Meds & Orders section of the refill encounter.              Passed - Medication is active on med list        Passed - Patient is age 18 or older        Passed - Normal serum creatinine on file in past 12 months     Recent Labs   Lab Test 03/22/21  0851   CR 0.93       Ok to refill medication if creatinine is low          Passed - Normal serum potassium on file in past 12 months     Recent Labs   Lab Test 03/22/21  0851   POTASSIUM 4.7                  Dell Pickard RN 09/13/21 11:16 AM  "

## 2021-10-16 ENCOUNTER — HEALTH MAINTENANCE LETTER (OUTPATIENT)
Age: 76
End: 2021-10-16

## 2021-10-17 ENCOUNTER — MYC MEDICAL ADVICE (OUTPATIENT)
Dept: FAMILY MEDICINE | Facility: CLINIC | Age: 76
End: 2021-10-17

## 2021-10-17 DIAGNOSIS — E11.9 CONTROLLED TYPE 2 DIABETES MELLITUS WITHOUT COMPLICATION, WITHOUT LONG-TERM CURRENT USE OF INSULIN (H): Primary | ICD-10-CM

## 2021-10-25 NOTE — TELEPHONE ENCOUNTER
Pt has well controlled diabetes mellitus so I am ok with just checking an A1C unless he has other issues.

## 2021-11-03 ENCOUNTER — LAB (OUTPATIENT)
Dept: LAB | Facility: CLINIC | Age: 76
End: 2021-11-03
Payer: MEDICARE

## 2021-11-03 DIAGNOSIS — E11.9 CONTROLLED TYPE 2 DIABETES MELLITUS WITHOUT COMPLICATION, WITHOUT LONG-TERM CURRENT USE OF INSULIN (H): ICD-10-CM

## 2021-11-03 LAB — HBA1C MFR BLD: 7.7 % (ref 0–5.6)

## 2021-11-03 PROCEDURE — 36415 COLL VENOUS BLD VENIPUNCTURE: CPT

## 2021-11-03 PROCEDURE — 83036 HEMOGLOBIN GLYCOSYLATED A1C: CPT

## 2021-11-29 ENCOUNTER — OFFICE VISIT (OUTPATIENT)
Dept: FAMILY MEDICINE | Facility: CLINIC | Age: 76
End: 2021-11-29
Payer: MEDICARE

## 2021-11-29 VITALS
SYSTOLIC BLOOD PRESSURE: 134 MMHG | HEIGHT: 70 IN | WEIGHT: 150.5 LBS | OXYGEN SATURATION: 98 % | HEART RATE: 57 BPM | DIASTOLIC BLOOD PRESSURE: 62 MMHG | BODY MASS INDEX: 21.55 KG/M2

## 2021-11-29 DIAGNOSIS — E11.9 CONTROLLED TYPE 2 DIABETES MELLITUS WITHOUT COMPLICATION, WITHOUT LONG-TERM CURRENT USE OF INSULIN (H): Primary | ICD-10-CM

## 2021-11-29 PROCEDURE — 99213 OFFICE O/P EST LOW 20 MIN: CPT | Performed by: FAMILY MEDICINE

## 2021-11-29 RX ORDER — LANCETS 28 GAUGE
EACH MISCELLANEOUS
Qty: 100 EACH | Refills: 4 | Status: SHIPPED | OUTPATIENT
Start: 2021-11-29 | End: 2023-11-07

## 2021-11-29 ASSESSMENT — MIFFLIN-ST. JEOR: SCORE: 1400.97

## 2021-11-29 NOTE — PROGRESS NOTES
"Chinedu was seen today for diabetes.    Diagnoses and all orders for this visit:    Controlled type 2 diabetes mellitus without complication, without long-term current use of insulin (H)  -     blood glucose monitoring (FREESTYLE) lancets; Use to test blood sugar 1 times daily  -     Hemoglobin A1c; Future    I agree with diet changes and recheck A1C in 3 months.  We also discuss functional medicine approach to cognitive decline.      Patient Instructions   The End of Alzheimer's  Dr. Kavon Valentin    [id number]    Follow-up in 3 months for A1C     SUBJECTIVE: Chinedu Cifuentes is a 76 year old male who presents with the following:  Patient presents with:  Diabetes: discussed numbers    His A1C level increased.  He is taking Amaryl 4 mg daily.  He was traveling and eating more carbs in the last few months.  Normally does not eat these foods.  Has been taking meds regularly.  He does check his blood sugars daily and needs refill of lancets.  Since he returned home he has modified his diet. He has lost about 5-6 lbs.  He has stopped drinking wine.  His blood sugars are now down in low 100s fasting.  He does walk 1-2 hours a day. He would like to hold off on more medication and recheck A1C first.    He wonders about supplements / etc for cognitive decline. His father had dementia and his brother also some memory issues.  He denies any significant symptoms now but wants to be proactive.    OBJECTIVE: /62 (BP Location: Left arm, Patient Position: Sitting, Cuff Size: Adult Regular)   Pulse 57   Ht 1.765 m (5' 9.5\")   Wt 68.3 kg (150 lb 8 oz)   SpO2 98%   BMI 21.91 kg/m   no distress  GENERAL: Healthy, alert and no distress  EYES: Eyes grossly normal to inspection.  No discharge or erythema, or obvious scleral/conjunctival abnormalities.  RESP: No audible wheeze, cough, or visible " cyanosis.  No visible retractions or increased work of breathing.    SKIN: Visible skin clear. No significant rash, abnormal pigmentation or lesions.  NEURO: Cranial nerves grossly intact.  Mentation and speech appropriate for age.  PSYCH: Mentation appears normal, affect normal/bright, judgement and insight intact, normal speech and appearance well-groomed.        Lab Results   Component Value Date    A1C 7.7 11/03/2021    A1C 6.9 03/22/2021    A1C 6.4 07/27/2020    A1C 7.2 09/04/2019    A1C 7.2 08/13/2018             Joana Mckeon MD

## 2021-11-29 NOTE — PROGRESS NOTES
There are no diagnoses linked to this encounter.      SUBJECTIVE: Chinedu Cifuentes is a 76 year old male who presents with the following:  Patient presents with:  Diabetes: discussed numbers  He is taking Amaryl 4 mg daily.  He was traveling and eating more carbs.    He has lost about 5-6 lbs.  He has stopped wine.  His blood sugars are now down in low 100s fasting.  He does walk 1-2 hours a day.          OBJECTIVE:         Lab Results   Component Value Date    A1C 7.7 11/03/2021    A1C 6.9 03/22/2021    A1C 6.4 07/27/2020    A1C 7.2 09/04/2019    A1C 7.2 08/13/2018               Joana Mckeon MD   Answers for HPI/ROS submitted by the patient on 11/27/2021  Frequency of checking blood sugars:: a few times a month  What time of day are you checking your blood sugars : before meals  Have you had any blood sugars above 200?: No  Have you had any blood sugars below 70?: No  Hypoglycemia symptoms:: none  Diabetic concerns:: none  Paraesthesia present:: none of these symptoms  On average, how many sweetened beverages do you drink each day (Examples: soda, juice, sweet tea, etc.  Do NOT count diet or artificially sweetened beverages)?: 0  How many days a week do you exercise enough to make your heart beat faster?: 7  How many days per week do you miss taking your medication?: 0

## 2021-11-29 NOTE — PATIENT INSTRUCTIONS
The End of Alzheimer's  Dr. Kavon Valentin    [id number]    Follow-up in 3 months for A1C

## 2021-12-07 ENCOUNTER — MEDICAL CORRESPONDENCE (OUTPATIENT)
Dept: HEALTH INFORMATION MANAGEMENT | Facility: CLINIC | Age: 76
End: 2021-12-07
Payer: MEDICARE

## 2021-12-13 DIAGNOSIS — E11.9 CONTROLLED TYPE 2 DIABETES MELLITUS WITHOUT COMPLICATION, WITHOUT LONG-TERM CURRENT USE OF INSULIN (H): ICD-10-CM

## 2021-12-15 RX ORDER — LANCETS 28 GAUGE
EACH MISCELLANEOUS
Qty: 100 EACH | Refills: 4 | OUTPATIENT
Start: 2021-12-15

## 2022-03-23 ENCOUNTER — LAB (OUTPATIENT)
Dept: LAB | Facility: CLINIC | Age: 77
End: 2022-03-23
Payer: MEDICARE

## 2022-03-23 DIAGNOSIS — E11.9 CONTROLLED TYPE 2 DIABETES MELLITUS WITHOUT COMPLICATION, WITHOUT LONG-TERM CURRENT USE OF INSULIN (H): ICD-10-CM

## 2022-03-23 LAB — HBA1C MFR BLD: 7.5 % (ref 0–5.6)

## 2022-03-23 PROCEDURE — 83036 HEMOGLOBIN GLYCOSYLATED A1C: CPT

## 2022-03-23 PROCEDURE — 36415 COLL VENOUS BLD VENIPUNCTURE: CPT

## 2022-03-29 ENCOUNTER — TRANSFERRED RECORDS (OUTPATIENT)
Dept: HEALTH INFORMATION MANAGEMENT | Facility: CLINIC | Age: 77
End: 2022-03-29
Payer: MEDICARE

## 2022-03-29 LAB — RETINOPATHY: NEGATIVE

## 2022-04-13 ENCOUNTER — MYC MEDICAL ADVICE (OUTPATIENT)
Dept: FAMILY MEDICINE | Facility: CLINIC | Age: 77
End: 2022-04-13
Payer: MEDICARE

## 2022-04-26 ENCOUNTER — OFFICE VISIT (OUTPATIENT)
Dept: FAMILY MEDICINE | Facility: CLINIC | Age: 77
End: 2022-04-26
Payer: MEDICARE

## 2022-04-26 VITALS
OXYGEN SATURATION: 100 % | SYSTOLIC BLOOD PRESSURE: 132 MMHG | HEART RATE: 61 BPM | HEIGHT: 70 IN | BODY MASS INDEX: 21.46 KG/M2 | WEIGHT: 149.9 LBS | DIASTOLIC BLOOD PRESSURE: 62 MMHG

## 2022-04-26 DIAGNOSIS — I10 ESSENTIAL HYPERTENSION, BENIGN: ICD-10-CM

## 2022-04-26 DIAGNOSIS — M25.521 RIGHT ELBOW PAIN: ICD-10-CM

## 2022-04-26 DIAGNOSIS — Z13.220 SCREENING FOR HYPERLIPIDEMIA: ICD-10-CM

## 2022-04-26 DIAGNOSIS — E11.9 CONTROLLED TYPE 2 DIABETES MELLITUS WITHOUT COMPLICATION, WITHOUT LONG-TERM CURRENT USE OF INSULIN (H): Primary | ICD-10-CM

## 2022-04-26 DIAGNOSIS — H61.22 IMPACTED CERUMEN OF LEFT EAR: ICD-10-CM

## 2022-04-26 LAB
ALBUMIN SERPL-MCNC: 4.4 G/DL (ref 3.5–5)
ALP SERPL-CCNC: 64 U/L (ref 45–120)
ALT SERPL W P-5'-P-CCNC: 24 U/L (ref 0–45)
ANION GAP SERPL CALCULATED.3IONS-SCNC: 14 MMOL/L (ref 5–18)
AST SERPL W P-5'-P-CCNC: 19 U/L (ref 0–40)
BILIRUB SERPL-MCNC: 1.1 MG/DL (ref 0–1)
BUN SERPL-MCNC: 15 MG/DL (ref 8–28)
CALCIUM SERPL-MCNC: 9.8 MG/DL (ref 8.5–10.5)
CHLORIDE BLD-SCNC: 102 MMOL/L (ref 98–107)
CO2 SERPL-SCNC: 25 MMOL/L (ref 22–31)
CREAT SERPL-MCNC: 0.98 MG/DL (ref 0.7–1.3)
CREAT UR-MCNC: 101 MG/DL
GFR SERPL CREATININE-BSD FRML MDRD: 79 ML/MIN/1.73M2
GLUCOSE BLD-MCNC: 125 MG/DL (ref 70–125)
LDLC SERPL CALC-MCNC: 89 MG/DL
MICROALBUMIN UR-MCNC: 1.08 MG/DL (ref 0–1.99)
MICROALBUMIN/CREAT UR: 10.7 MG/G CR
POTASSIUM BLD-SCNC: 4.1 MMOL/L (ref 3.5–5)
PROT SERPL-MCNC: 7.2 G/DL (ref 6–8)
SODIUM SERPL-SCNC: 141 MMOL/L (ref 136–145)

## 2022-04-26 PROCEDURE — 99214 OFFICE O/P EST MOD 30 MIN: CPT | Performed by: FAMILY MEDICINE

## 2022-04-26 PROCEDURE — 36415 COLL VENOUS BLD VENIPUNCTURE: CPT | Performed by: FAMILY MEDICINE

## 2022-04-26 PROCEDURE — 83721 ASSAY OF BLOOD LIPOPROTEIN: CPT | Performed by: FAMILY MEDICINE

## 2022-04-26 PROCEDURE — 82043 UR ALBUMIN QUANTITATIVE: CPT | Performed by: FAMILY MEDICINE

## 2022-04-26 PROCEDURE — 80053 COMPREHEN METABOLIC PANEL: CPT | Performed by: FAMILY MEDICINE

## 2022-04-26 RX ORDER — AMMONIUM LACTATE 12 G/100G
LOTION TOPICAL 2 TIMES DAILY
COMMUNITY
Start: 2022-04-10 | End: 2023-11-07

## 2022-04-26 NOTE — PROGRESS NOTES
Chinedu was seen today for results.    Diagnoses and all orders for this visit:    Controlled type 2 diabetes mellitus without complication, without long-term current use of insulin (H) - He feels he can improve his A1C with diet so does not want to change meds.  F/U in 6 months.  -     REVIEW OF HEALTH MAINTENANCE PROTOCOL ORDERS  -     Albumin Random Urine Quantitative with Creat Ratio  -     Comprehensive metabolic panel (BMP + Alb, Alk Phos, ALT, AST, Total. Bili, TP)    Essential hypertension, benign- Stable    Screening for hyperlipidemia- Check LDL on statin  -     LDL cholesterol direct    Right elbow pain- R/O elbow osteoarthritis   -     XR Elbow Right 2 Views; Future    Impacted cerumen of left ear- Return for lavage          SUBJECTIVE: Chinedu Cifuentes is a 76 year old male who presents with the following:  Patient presents with:  Results: Diabetes lab f/u  1) diabetes mellitus type 2 - He has been eating better.  Being more strict with carbs.  Blood sugars are 110-135 fasting.  He walks 1 hour daily.  He is taking Amaryl 4 mg daily.  Saw eye provider in past year with no retinopathy. No numbness or tingling in feet.  No shortness of breath.    2) He feels hearing is off.  Left ear feels like there is wax build-up.  Has some popping of the ear.  No rhinorrhea/ sneezing but has slight cough.    3) Has pain in right arm.  First thought he slept on arm wrong.  Pain goes up and down arm but seems to start at elbow.  Tried to play basketball with grandson and had pain first then improved.      4) HTN- Readings run 132-138 / 66-68.  Taking amlodipine/ benazepril daily.  No side effects.    5) Elevated cholesterol - Taking atorvastatin every other day.  No side effects.    Answers for HPI/ROS submitted by the patient on 4/26/2022  Frequency of checking blood sugars:: a few times a month  What time of day are you checking your blood sugars : before meals  Have you had any blood sugars above 200?: No  Have you  "had any blood sugars below 70?: No  Hypoglycemia symptoms:: none  Diabetic concerns:: none  Paraesthesia present:: none of these symptoms  How many servings of fruits and vegetables do you eat daily?: 2-3  On average, how many sweetened beverages do you drink each day (Examples: soda, juice, sweet tea, etc.  Do NOT count diet or artificially sweetened beverages)?: 0  How many minutes a day do you exercise enough to make your heart beat faster?: 60 or more  How many days a week do you exercise enough to make your heart beat faster?: 7  How many days per week do you miss taking your medication?: 0          OBJECTIVE: /62 (BP Location: Right arm, Patient Position: Sitting, Cuff Size: Adult Regular)   Pulse 61   Ht 1.765 m (5' 9.5\")   Wt 68 kg (149 lb 14.4 oz)   SpO2 100%   BMI 21.82 kg/m     No acute distress.  HEENT: Head is atraumatic and/normocephalic.  PERRL.  Conjunctiva clear.  Right tympanic membranes grey with normal landmarks and normal light reflexes.  Left ear occluded with cerumen.  No nasal discharge.  Oropharynx is pink and moist.    Neck: Supple.  No lymphadenopathy or thyromegaly.  Lungs: Clear to auscultation.  No wheezing, retractions, or tachypnea.  Heart: RRR. S1 and S2 normal.  No murmurs, rubs, or gallops.  Right elbow: Full range of motion with some snapping.  No tenderness/ swelling / erythema.  No pain over lateral / medial epicondyles.  Neuro: Awake, alert, oriented x 3.  Normal strength and tone.  Normal gait.   Lower extremities: Bilateral thickened crumbly nails.  Feet are erythematous.  No lesions.  PT 2+ bilaterally.  Sensation intact to monofilament testing on plantar aspect of feet bilaterally.     Hemoglobin A1C   Date Value Ref Range Status   03/23/2022 7.5 (H) 0.0 - 5.6 % Final     Comment:     Normal <5.7%   Prediabetes 5.7-6.4%    Diabetes 6.5% or higher     Note: Adopted from ADA consensus guidelines.         Joana Mckeon MD     "

## 2022-04-27 ENCOUNTER — TRANSFERRED RECORDS (OUTPATIENT)
Dept: HEALTH INFORMATION MANAGEMENT | Facility: CLINIC | Age: 77
End: 2022-04-27
Payer: MEDICARE

## 2022-05-03 ENCOUNTER — DOCUMENTATION ONLY (OUTPATIENT)
Dept: OTHER | Facility: CLINIC | Age: 77
End: 2022-05-03
Payer: MEDICARE

## 2022-05-05 DIAGNOSIS — E11.9 TYPE 2 DIABETES MELLITUS (H): ICD-10-CM

## 2022-05-08 RX ORDER — GLIMEPIRIDE 2 MG/1
TABLET ORAL
Qty: 180 TABLET | Refills: 1 | Status: SHIPPED | OUTPATIENT
Start: 2022-05-08 | End: 2022-11-06

## 2022-05-08 NOTE — TELEPHONE ENCOUNTER
"Last Written Prescription Date:  5/24/2021  Last Fill Quantity: 180,  # refills: 3   Last office visit provider:  4/26/2022     Requested Prescriptions   Pending Prescriptions Disp Refills     glimepiride (AMARYL) 2 MG tablet [Pharmacy Med Name: GLIMEPIRIDE 2MG TABLETS] 180 tablet 3     Sig: TAKE 2 TABLETS(4 MG) BY MOUTH DAILY       Sulfonylurea Agents Passed - 5/8/2022  2:31 PM        Passed - Patient has documented A1c within the specified period of time.     If HgbA1C is 8 or greater, it needs to be on file within the past 3 months.  If less than 8, must be on file within the past 6 months.     Recent Labs   Lab Test 03/23/22  0746   A1C 7.5*             Passed - Medication is active on med list        Passed - Patient is age 18 or older        Passed - Patient has a recent creatinine (normal) within the past 12 mos.     Recent Labs   Lab Test 04/26/22  1544   CR 0.98       Ok to refill medication if creatinine is low          Passed - Recent (6 mo) or future (30 days) visit within the authorizing provider's specialty     Patient had office visit in the last 6 months or has a visit in the next 30 days with authorizing provider or within the authorizing provider's specialty.  See \"Patient Info\" tab in inbasket, or \"Choose Columns\" in Meds & Orders section of the refill encounter.                 Tonie Maldonado RN 05/08/22 2:32 PM  "

## 2022-05-17 DIAGNOSIS — I10 ESSENTIAL HYPERTENSION, BENIGN: ICD-10-CM

## 2022-05-18 RX ORDER — BENAZEPRIL HYDROCHLORIDE 10 MG/1
TABLET ORAL
Qty: 90 TABLET | Refills: 2 | Status: SHIPPED | OUTPATIENT
Start: 2022-05-18 | End: 2023-02-13

## 2022-05-18 NOTE — TELEPHONE ENCOUNTER
"Routing refill request to provider for review/approval because:  Patient reports taking medication differently.  Early refill requested.    Last Written Prescription Date:  9/13/21  Last Fill Quantity: 90,  # refills: 2   Last office visit provider:  4/26/22     Requested Prescriptions   Pending Prescriptions Disp Refills     benazepril (LOTENSIN) 10 MG tablet [Pharmacy Med Name: BENAZEPRIL 10MG TABLETS] 90 tablet 2     Sig: TAKE 1 TABLET(10 MG) BY MOUTH DAILY       ACE Inhibitors (Including Combos) Protocol Passed - 5/18/2022 11:17 AM        Passed - Blood pressure under 140/90 in past 12 months     BP Readings from Last 3 Encounters:   04/26/22 132/62   11/29/21 134/62   07/11/21 137/64                 Passed - Recent (12 mo) or future (30 days) visit within the authorizing provider's specialty     Patient has had an office visit with the authorizing provider or a provider within the authorizing providers department within the previous 12 mos or has a future within next 30 days. See \"Patient Info\" tab in inbasket, or \"Choose Columns\" in Meds & Orders section of the refill encounter.              Passed - Medication is active on med list        Passed - Patient is age 18 or older        Passed - Normal serum creatinine on file in past 12 months     Recent Labs   Lab Test 04/26/22  1544   CR 0.98       Ok to refill medication if creatinine is low          Passed - Normal serum potassium on file in past 12 months     Recent Labs   Lab Test 04/26/22  1544   POTASSIUM 4.1                  Dell iPckard RN 05/18/22 11:17 AM  "

## 2022-07-23 ENCOUNTER — HEALTH MAINTENANCE LETTER (OUTPATIENT)
Age: 77
End: 2022-07-23

## 2022-07-24 ENCOUNTER — MYC MEDICAL ADVICE (OUTPATIENT)
Dept: FAMILY MEDICINE | Facility: CLINIC | Age: 77
End: 2022-07-24

## 2022-07-24 DIAGNOSIS — U07.1 INFECTION DUE TO 2019 NOVEL CORONAVIRUS: Primary | ICD-10-CM

## 2022-08-17 DIAGNOSIS — I10 ESSENTIAL HYPERTENSION, BENIGN: ICD-10-CM

## 2022-08-17 RX ORDER — AMLODIPINE BESYLATE 2.5 MG/1
2.5 TABLET ORAL DAILY
Qty: 90 TABLET | Refills: 2 | Status: SHIPPED | OUTPATIENT
Start: 2022-08-17 | End: 2023-05-10

## 2022-08-17 NOTE — TELEPHONE ENCOUNTER
"Routing refill request to provider for review/approval because:  Early refill requested.    Last Written Prescription Date:  9/10/21  Last Fill Quantity: 90,  # refills: 3   Last office visit provider:  4/26/22     Requested Prescriptions   Pending Prescriptions Disp Refills     amLODIPine (NORVASC) 2.5 MG tablet [Pharmacy Med Name: AMLODIPINE BESYLATE 2.5MG TABLETS] 90 tablet 3     Sig: TAKE 1 TABLET BY MOUTH EVERY DAY       Calcium Channel Blockers Protocol  Passed - 8/17/2022  3:26 PM        Passed - Blood pressure under 140/90 in past 12 months     BP Readings from Last 3 Encounters:   04/26/22 132/62   11/29/21 134/62   07/11/21 137/64                 Passed - Recent (12 mo) or future (30 days) visit within the authorizing provider's specialty     Patient has had an office visit with the authorizing provider or a provider within the authorizing providers department within the previous 12 mos or has a future within next 30 days. See \"Patient Info\" tab in inbasket, or \"Choose Columns\" in Meds & Orders section of the refill encounter.              Passed - Medication is active on med list        Passed - Patient is age 18 or older        Passed - Normal serum creatinine on file in past 12 months     Recent Labs   Lab Test 04/26/22  1544   CR 0.98       Ok to refill medication if creatinine is low               Dell Pickard RN 08/17/22 3:26 PM  "

## 2022-10-01 ENCOUNTER — HEALTH MAINTENANCE LETTER (OUTPATIENT)
Age: 77
End: 2022-10-01

## 2022-11-04 DIAGNOSIS — E11.9 TYPE 2 DIABETES MELLITUS (H): ICD-10-CM

## 2022-11-05 NOTE — TELEPHONE ENCOUNTER
"Routing refill request to provider for review/approval because:  Labs out of range:  a1c    Last Written Prescription Date:  5/8/22  Last Fill Quantity: 180,  # refills: 1   Last office visit provider:  4/26/22     Requested Prescriptions   Pending Prescriptions Disp Refills     glimepiride (AMARYL) 2 MG tablet [Pharmacy Med Name: GLIMEPIRIDE 2MG TABLETS] 180 tablet 1     Sig: TAKE 2 TABLETS(4 MG) BY MOUTH DAILY       Sulfonylurea Agents Failed - 11/4/2022  3:50 AM        Failed - Patient has documented A1c within the specified period of time.     If HgbA1C is 8 or greater, it needs to be on file within the past 3 months.  If less than 8, must be on file within the past 6 months.     Recent Labs   Lab Test 03/23/22  0746   A1C 7.5*             Failed - Recent (6 mo) or future (30 days) visit within the authorizing provider's specialty     Patient had office visit in the last 6 months or has a visit in the next 30 days with authorizing provider or within the authorizing provider's specialty.  See \"Patient Info\" tab in inbasket, or \"Choose Columns\" in Meds & Orders section of the refill encounter.            Passed - Medication is active on med list        Passed - Patient is age 18 or older        Passed - Patient has a recent creatinine (normal) within the past 12 mos.     Recent Labs   Lab Test 04/26/22  1544   CR 0.98       Ok to refill medication if creatinine is low               Krystle Dougherty RN 11/05/22 2:22 PM  "

## 2022-11-06 RX ORDER — GLIMEPIRIDE 2 MG/1
TABLET ORAL
Qty: 180 TABLET | Refills: 1 | Status: SHIPPED | OUTPATIENT
Start: 2022-11-06 | End: 2023-05-10

## 2022-11-07 ENCOUNTER — MYC MEDICAL ADVICE (OUTPATIENT)
Dept: FAMILY MEDICINE | Facility: CLINIC | Age: 77
End: 2022-11-07

## 2022-11-07 DIAGNOSIS — E11.9 CONTROLLED TYPE 2 DIABETES MELLITUS WITHOUT COMPLICATION, WITHOUT LONG-TERM CURRENT USE OF INSULIN (H): Primary | ICD-10-CM

## 2022-11-07 DIAGNOSIS — E11.9 TYPE 2 DIABETES MELLITUS (H): ICD-10-CM

## 2022-11-08 NOTE — TELEPHONE ENCOUNTER
Patient wondering about A1c orders. Cannot get in for AWV until late February and does not want to wait that long to have A1c checked. Order pended below, please sign if agree.

## 2022-11-08 NOTE — TELEPHONE ENCOUNTER
Annual Wellness appts scheduled for 02/28/23    Patient will be out of town for all of January and Most of February so this is the earliest they can come in with providers openings.    General Call    Reason for Call:  A1C recheck    What are your questions or concerns:  Patient wondering if he can get new orders for A1C recheck since its supposed to be every 6 months and he can't come in until late February for annual.    Date of last appointment with provider: 04/26/22  Date of next appointment: 02/28/23    Could we send this information to you in [employer] or would you prefer to receive a phone call?:   Patient would prefer a phone call     Okay to leave a detailed message?: Yes at Home number on file [phone] (home)

## 2022-12-02 ENCOUNTER — MYC MEDICAL ADVICE (OUTPATIENT)
Dept: FAMILY MEDICINE | Facility: CLINIC | Age: 77
End: 2022-12-02

## 2022-12-02 DIAGNOSIS — E11.9 CONTROLLED TYPE 2 DIABETES MELLITUS WITHOUT COMPLICATION, WITHOUT LONG-TERM CURRENT USE OF INSULIN (H): Primary | ICD-10-CM

## 2022-12-08 ENCOUNTER — MEDICAL CORRESPONDENCE (OUTPATIENT)
Dept: HEALTH INFORMATION MANAGEMENT | Facility: CLINIC | Age: 77
End: 2022-12-08

## 2022-12-19 ENCOUNTER — LAB (OUTPATIENT)
Dept: LAB | Facility: CLINIC | Age: 77
End: 2022-12-19
Payer: MEDICARE

## 2022-12-19 DIAGNOSIS — E11.9 CONTROLLED TYPE 2 DIABETES MELLITUS WITHOUT COMPLICATION, WITHOUT LONG-TERM CURRENT USE OF INSULIN (H): ICD-10-CM

## 2022-12-19 DIAGNOSIS — Z13.220 SCREENING FOR HYPERLIPIDEMIA: ICD-10-CM

## 2022-12-19 LAB — HBA1C MFR BLD: 7.3 % (ref 0–5.6)

## 2022-12-19 PROCEDURE — 36415 COLL VENOUS BLD VENIPUNCTURE: CPT

## 2022-12-19 PROCEDURE — 83036 HEMOGLOBIN GLYCOSYLATED A1C: CPT

## 2023-02-12 DIAGNOSIS — I10 ESSENTIAL HYPERTENSION, BENIGN: ICD-10-CM

## 2023-02-13 RX ORDER — BENAZEPRIL HYDROCHLORIDE 10 MG/1
10 TABLET ORAL DAILY
Qty: 90 TABLET | Refills: 0 | Status: SHIPPED | OUTPATIENT
Start: 2023-02-13 | End: 2023-05-10

## 2023-02-13 NOTE — TELEPHONE ENCOUNTER
"Last Written Prescription Date:  5/18/22  Last Fill Quantity: 90,  # refills: 2   Last office visit provider:  4/26/22     Requested Prescriptions   Pending Prescriptions Disp Refills     benazepril (LOTENSIN) 10 MG tablet [Pharmacy Med Name: BENAZEPRIL 10MG TABLETS] 90 tablet 2     Sig: TAKE 1 TABLET(10 MG) BY MOUTH DAILY       ACE Inhibitors (Including Combos) Protocol Passed - 2/13/2023  1:15 PM        Passed - Blood pressure under 140/90 in past 12 months     BP Readings from Last 3 Encounters:   04/26/22 132/62   11/29/21 134/62   07/11/21 137/64                 Passed - Recent (12 mo) or future (30 days) visit within the authorizing provider's specialty     Patient has had an office visit with the authorizing provider or a provider within the authorizing providers department within the previous 12 mos or has a future within next 30 days. See \"Patient Info\" tab in inbasket, or \"Choose Columns\" in Meds & Orders section of the refill encounter.              Passed - Medication is active on med list        Passed - Patient is age 18 or older        Passed - Normal serum creatinine on file in past 12 months     Recent Labs   Lab Test 04/26/22  1544   CR 0.98       Ok to refill medication if creatinine is low          Passed - Normal serum potassium on file in past 12 months     Recent Labs   Lab Test 04/26/22  1544   POTASSIUM 4.1                  Dell Pickard RN 02/13/23 1:15 PM  "

## 2023-02-22 ENCOUNTER — TELEPHONE (OUTPATIENT)
Dept: FAMILY MEDICINE | Facility: CLINIC | Age: 78
End: 2023-02-22
Payer: MEDICARE

## 2023-02-22 DIAGNOSIS — H61.20 IMPACTED CERUMEN, UNSPECIFIED LATERALITY: Primary | ICD-10-CM

## 2023-02-22 NOTE — TELEPHONE ENCOUNTER
Left message to call back for: Patient  Information to relay to patient: No voicemail, line just rang and hung up after two attempts.     If patient returns call, see message below and help patient schedule.

## 2023-02-22 NOTE — TELEPHONE ENCOUNTER
I am not in clinic on 2/28/2023.  My only concern with RN only visit is if earwax is not the problem, then my partners would need to see him and I am not comfortable adding on to their schedules.  I would prefer if pt could come in on a day when I am working then I am fine with nurse only visit.  Pt could go to walk-in clinic at [facility] or [facility] if he wants that particular day for service.

## 2023-02-22 NOTE — TELEPHONE ENCOUNTER
Reason for Call:  Appointment Request    Patient requesting this type of appt:  Clean ear wax    Requested provider: Joana Mckeon    Reason patient unable to be scheduled: Not within requested timeframe    When does patient want to be seen/preferred time: 2-28 around 10 am    Comments: please call patient. He is wondering he could be worked in around this time, or get an order to have a nurse clean his ears?     Could we send this information to you in [facility] or would you prefer to receive a phone call?:   No preference   Okay to leave a detailed message?: Yes at Home number on file [phone] (home)    Call taken on 2/22/2023 at 10:37 AM by Ivanna Spears

## 2023-02-27 NOTE — TELEPHONE ENCOUNTER
Pt is on 3/2/23 CSS schedule for ear irrigation.    Order required.    Please review/approve pended order.    Thank you.

## 2023-02-28 ENCOUNTER — ALLIED HEALTH/NURSE VISIT (OUTPATIENT)
Dept: FAMILY MEDICINE | Facility: CLINIC | Age: 78
End: 2023-02-28
Payer: MEDICARE

## 2023-02-28 DIAGNOSIS — H61.20 IMPACTED CERUMEN, UNSPECIFIED LATERALITY: Primary | ICD-10-CM

## 2023-02-28 PROCEDURE — 99207 PR NO CHARGE NURSE ONLY: CPT

## 2023-02-28 NOTE — PROGRESS NOTES
RN ear assessment completed prior to ear irrigation. Otoscopic exam reveals cerumen present and irrigation advised. Post Ear Irrigation exam completed and cerumen plug removed, tympanic membrane intact and no bleeding noted.  Pain assessment completed.     Patient tolerated procedure:  yes     Hunter Landry RN  [facility]

## 2023-03-31 ENCOUNTER — OFFICE VISIT (OUTPATIENT)
Dept: FAMILY MEDICINE | Facility: CLINIC | Age: 78
End: 2023-03-31
Payer: MEDICARE

## 2023-03-31 VITALS
WEIGHT: 149 LBS | DIASTOLIC BLOOD PRESSURE: 63 MMHG | TEMPERATURE: 97.9 F | OXYGEN SATURATION: 100 % | BODY MASS INDEX: 22.07 KG/M2 | SYSTOLIC BLOOD PRESSURE: 132 MMHG | HEIGHT: 69 IN | RESPIRATION RATE: 16 BRPM | HEART RATE: 64 BPM

## 2023-03-31 DIAGNOSIS — E55.9 VITAMIN D DEFICIENCY: ICD-10-CM

## 2023-03-31 DIAGNOSIS — Z00.00 HEALTH CARE MAINTENANCE: Primary | ICD-10-CM

## 2023-03-31 DIAGNOSIS — E11.9 CONTROLLED TYPE 2 DIABETES MELLITUS WITHOUT COMPLICATION, WITHOUT LONG-TERM CURRENT USE OF INSULIN (H): ICD-10-CM

## 2023-03-31 DIAGNOSIS — Z13.220 SCREENING FOR HYPERLIPIDEMIA: ICD-10-CM

## 2023-03-31 DIAGNOSIS — I10 ESSENTIAL HYPERTENSION, BENIGN: ICD-10-CM

## 2023-03-31 PROBLEM — N40.1 BENIGN PROSTATIC HYPERPLASIA WITH URINARY OBSTRUCTION: Status: ACTIVE | Noted: 2021-02-18

## 2023-03-31 PROBLEM — N13.8 BENIGN PROSTATIC HYPERPLASIA WITH URINARY OBSTRUCTION: Status: ACTIVE | Noted: 2021-02-18

## 2023-03-31 LAB
ANION GAP SERPL CALCULATED.3IONS-SCNC: 10 MMOL/L (ref 7–15)
BUN SERPL-MCNC: 19.8 MG/DL (ref 8–23)
CALCIUM SERPL-MCNC: 9.9 MG/DL (ref 8.8–10.2)
CHLORIDE SERPL-SCNC: 105 MMOL/L (ref 98–107)
CHOLEST SERPL-MCNC: 157 MG/DL
CREAT SERPL-MCNC: 0.93 MG/DL (ref 0.67–1.17)
CREAT UR-MCNC: 124 MG/DL
DEPRECATED HCO3 PLAS-SCNC: 28 MMOL/L (ref 22–29)
GFR SERPL CREATININE-BSD FRML MDRD: 84 ML/MIN/1.73M2
GLUCOSE SERPL-MCNC: 176 MG/DL (ref 70–99)
HBA1C MFR BLD: 7.3 % (ref 0–5.6)
HDLC SERPL-MCNC: 51 MG/DL
LDLC SERPL CALC-MCNC: 72 MG/DL
LDLC SERPL DIRECT ASSAY-MCNC: 81 MG/DL
MICROALBUMIN UR-MCNC: <12 MG/L
MICROALBUMIN/CREAT UR: NORMAL MG/G{CREAT}
NONHDLC SERPL-MCNC: 106 MG/DL
POTASSIUM SERPL-SCNC: 4.1 MMOL/L (ref 3.4–5.3)
SODIUM SERPL-SCNC: 143 MMOL/L (ref 136–145)
TRIGL SERPL-MCNC: 172 MG/DL

## 2023-03-31 PROCEDURE — 83721 ASSAY OF BLOOD LIPOPROTEIN: CPT | Mod: 59 | Performed by: FAMILY MEDICINE

## 2023-03-31 PROCEDURE — 80048 BASIC METABOLIC PNL TOTAL CA: CPT | Performed by: FAMILY MEDICINE

## 2023-03-31 PROCEDURE — 82570 ASSAY OF URINE CREATININE: CPT | Performed by: FAMILY MEDICINE

## 2023-03-31 PROCEDURE — 80061 LIPID PANEL: CPT | Performed by: FAMILY MEDICINE

## 2023-03-31 PROCEDURE — G0439 PPPS, SUBSEQ VISIT: HCPCS | Performed by: FAMILY MEDICINE

## 2023-03-31 PROCEDURE — 83036 HEMOGLOBIN GLYCOSYLATED A1C: CPT | Performed by: FAMILY MEDICINE

## 2023-03-31 PROCEDURE — 36415 COLL VENOUS BLD VENIPUNCTURE: CPT | Performed by: FAMILY MEDICINE

## 2023-03-31 PROCEDURE — 82043 UR ALBUMIN QUANTITATIVE: CPT | Performed by: FAMILY MEDICINE

## 2023-03-31 PROCEDURE — 99214 OFFICE O/P EST MOD 30 MIN: CPT | Mod: 25 | Performed by: FAMILY MEDICINE

## 2023-03-31 PROCEDURE — 82306 VITAMIN D 25 HYDROXY: CPT | Performed by: FAMILY MEDICINE

## 2023-03-31 ASSESSMENT — ENCOUNTER SYMPTOMS
EYE PAIN: 0
NERVOUS/ANXIOUS: 0
HEMATURIA: 0
PALPITATIONS: 0
PARESTHESIAS: 0
COUGH: 0
DIZZINESS: 0
HEADACHES: 0
CHILLS: 0
SHORTNESS OF BREATH: 0
HEMATOCHEZIA: 0
JOINT SWELLING: 0
SORE THROAT: 0
DYSURIA: 0
DIARRHEA: 0
WEAKNESS: 0
FREQUENCY: 0
ABDOMINAL PAIN: 0
MYALGIAS: 0
CONSTIPATION: 0
HEARTBURN: 0
ARTHRALGIAS: 0
FEVER: 0
NAUSEA: 0

## 2023-03-31 ASSESSMENT — ACTIVITIES OF DAILY LIVING (ADL): CURRENT_FUNCTION: NO ASSISTANCE NEEDED

## 2023-03-31 NOTE — ASSESSMENT & PLAN NOTE
Due for colonoscopy this fall- had adenomatous polyps on last scope.  He will consider risks / benefits of continued screening.  Sees urology for prostate issues.

## 2023-03-31 NOTE — PROGRESS NOTES
"SUBJECTIVE:   Chinedu is a 77 year old who presents for Preventive Visit.  Additional Questions 3/31/2023   Roomed by xl   Accompanied by spouse Sheela   Patient has been advised of split billing requirements and indicates understanding: Yes  Are you in the first 12 months of your Medicare coverage?  No    Healthy Habits:     In general, how would you rate your overall health?  Good    Frequency of exercise:  6-7 days/week    Duration of exercise:  45-60 minutes    Do you usually eat at least 4 servings of fruit and vegetables a day, include whole grains    & fiber and avoid regularly eating high fat or \"junk\" foods?  Yes    Taking medications regularly:  Yes    Medication side effects:  None    Ability to successfully perform activities of daily living:  No assistance needed    Home Safety:  No safety concerns identified    Hearing Impairment:  No hearing concerns    In the past 6 months, have you been bothered by leaking of urine?  No    In general, how would you rate your overall mental or emotional health?  Good      PHQ-2 Total Score: 0    Additional concerns today:  No      Have you ever done Advance Care Planning? (For example, a Health Directive, POLST, or a discussion with a medical provider or your loved ones about your wishes): Yes, advance care planning is on file.       Fall risk  Fallen 2 or more times in the past year?: No  Any fall with injury in the past year?: No    Cognitive Screening   1) Repeat 3 items (Leader, Season, Table)  Normal  2) Clock draw: NORMAL  3) 3 item recall: Recalls 3 objects  Results: 3 items recalled: COGNITIVE IMPAIRMENT LESS LIKELY    Mini-CogTM Copyright TEJAS Davila. Licensed by the author for use in [facility]; reprinted with permission (kimi@.[facility]). All rights reserved.          Reviewed and updated as needed this visit by clinical staff   Tobacco  Allergies  Meds              Reviewed and updated as needed this visit by Provider                 Social " History     Tobacco Use     Smoking status: Never     Smokeless tobacco: Never   Substance Use Topics     Alcohol use: Yes     Alcohol/week: 7.0 standard drinks         Alcohol Use 3/31/2023   Prescreen: >3 drinks/day or >7 drinks/week? No     Do you have a current opioid prescription? No  Do you use any other controlled substances or medications that are not prescribed by a provider? None        Current providers sharing in care for this patient include:     Patient Care Team:  Joana Mckeon MD as PCP - General  Joana Mckeno MD as Assigned PCP    The following health maintenance items are reviewed in Epic and correct as of today:  Health Maintenance   Topic Date Due     DIABETIC FOOT EXAM  Never done     MEDICARE ANNUAL WELLNESS VISIT  03/22/2022     LIPID  03/22/2022     EYE EXAM  03/29/2023     BMP  04/26/2023     MICROALBUMIN  04/26/2023     A1C  06/19/2023     ANNUAL REVIEW OF HM ORDERS  03/31/2024     FALL RISK ASSESSMENT  03/31/2024     ADVANCE CARE PLANNING  03/31/2028     DTAP/TDAP/TD IMMUNIZATION (3 - Td or Tdap) 03/22/2031     HEPATITIS C SCREENING  Completed     PHQ-2 (once per calendar year)  Completed     INFLUENZA VACCINE  Completed     Pneumococcal Vaccine: 65+ Years  Completed     ZOSTER IMMUNIZATION  Completed     COVID-19 Vaccine  Completed     IPV IMMUNIZATION  Aged Out     MENINGITIS IMMUNIZATION  Aged Out     1) diabetes mellitus type 2 - He is taking Amaryl 4 mg daily for blood sugar control. Also taking atorvastatin.    2) HTN - He is taking amlodipine and lotensin for BP control.        Review of Systems   Constitutional: Negative for chills and fever.   HENT: Negative for congestion, ear pain, hearing loss and sore throat.    Eyes: Negative for pain and visual disturbance.   Respiratory: Negative for cough and shortness of breath.    Cardiovascular: Negative for chest pain, palpitations and peripheral edema.   Gastrointestinal: Negative for abdominal pain,  "constipation, diarrhea, heartburn, hematochezia and nausea.   Genitourinary: Positive for impotence. Negative for dysuria, frequency, genital sores, hematuria, penile discharge and urgency.   Musculoskeletal: Negative for arthralgias, joint swelling and myalgias.   Skin: Negative for rash.   Neurological: Negative for dizziness, weakness, headaches and paresthesias.   Psychiatric/Behavioral: Negative for mood changes. The patient is not nervous/anxious.          OBJECTIVE:   /63 (BP Location: Left arm, Patient Position: Sitting, Cuff Size: Adult Regular)   Pulse 64   Temp 97.9  F (36.6  C) (Oral)   Resp 16   Ht 1.753 m (5' 9\")   Wt 67.6 kg (149 lb)   SpO2 100%   BMI 22.00 kg/m   Estimated body mass index is 22 kg/m  as calculated from the following:    Height as of this encounter: 1.753 m (5' 9\").    Weight as of this encounter: 67.6 kg (149 lb).  Physical Exam  GENERAL: healthy, alert and no distress  EYES: Eyes grossly normal to inspection, PERRL and conjunctivae and sclerae normal  HENT: ear canals and TM's normal, nose and mouth without ulcers or lesions  NECK: no adenopathy, no asymmetry, masses, or scars and thyroid normal to palpation  RESP: lungs clear to auscultation - no rales, rhonchi or wheezes  CV: regular rate and rhythm, normal S1 S2, no S3 or S4, no murmur, click or rub, no peripheral edema and peripheral pulses strong  ABDOMEN: soft, nontender, no hepatosplenomegaly, no masses and bowel sounds normal  MS: no gross musculoskeletal defects noted, no edema  SKIN: no suspicious lesions or rashes  NEURO: Normal strength and tone, mentation intact and speech normal  PSYCH: mentation appears normal, affect normal/bright    Labs reviewed in Epic    ASSESSMENT / PLAN:     Problem List Items Addressed This Visit     Benign Essential Hypertension    Relevant Orders    BASIC METABOLIC PANEL    Controlled type 2 diabetes mellitus without complication, without long-term current use of insulin (H)    " Relevant Orders    Albumin Random Urine Quantitative with Creat Ratio    Cholesterol    HDL cholesterol    LDL cholesterol direct    Hemoglobin A1c    Vitamin D Deficiency    Relevant Orders    Vitamin D Deficiency    Health care maintenance - Primary     Due for colonoscopy this fall- had adenomatous polyps on last scope.  He will consider risks / benefits of continued screening.  Sees urology for prostate issues.         Relevant Orders    REVIEW OF HEALTH MAINTENANCE PROTOCOL ORDERS (Completed)             COUNSELING:  Reviewed preventive health counseling, as reflected in patient instructions        He reports that he has never smoked. He has never used smokeless tobacco.      Appropriate preventive services were discussed with this patient, including applicable screening as appropriate for cardiovascular disease, diabetes, osteopenia/osteoporosis, and glaucoma.  As appropriate for age/gender, discussed screening for colorectal cancer, prostate cancer, breast cancer, and cervical cancer. Checklist reviewing preventive services available has been given to the patient.    Reviewed patients plan of care and provided an AVS. The Basic Care Plan (routine screening as documented in Health Maintenance) for Chinedu meets the Care Plan requirement. This Care Plan has been established and reviewed with the Patient and spouse.      Joana Mckeon MD  [facility]    Identified Health Risks:

## 2023-04-03 LAB — DEPRECATED CALCIDIOL+CALCIFEROL SERPL-MC: 47 UG/L (ref 20–75)

## 2023-04-24 ENCOUNTER — TRANSFERRED RECORDS (OUTPATIENT)
Dept: HEALTH INFORMATION MANAGEMENT | Facility: CLINIC | Age: 78
End: 2023-04-24
Payer: MEDICARE

## 2023-05-04 ENCOUNTER — TRANSFERRED RECORDS (OUTPATIENT)
Dept: HEALTH INFORMATION MANAGEMENT | Facility: CLINIC | Age: 78
End: 2023-05-04
Payer: MEDICARE

## 2023-05-09 DIAGNOSIS — I10 ESSENTIAL HYPERTENSION, BENIGN: ICD-10-CM

## 2023-05-09 DIAGNOSIS — E11.9 TYPE 2 DIABETES MELLITUS (H): ICD-10-CM

## 2023-05-10 RX ORDER — GLIMEPIRIDE 2 MG/1
TABLET ORAL
Qty: 180 TABLET | Refills: 0 | Status: SHIPPED | OUTPATIENT
Start: 2023-05-10 | End: 2023-08-08

## 2023-05-10 RX ORDER — BENAZEPRIL HYDROCHLORIDE 10 MG/1
TABLET ORAL
Qty: 90 TABLET | Refills: 2 | Status: SHIPPED | OUTPATIENT
Start: 2023-05-10 | End: 2024-02-06

## 2023-05-10 RX ORDER — AMLODIPINE BESYLATE 2.5 MG/1
TABLET ORAL
Qty: 90 TABLET | Refills: 2 | Status: SHIPPED | OUTPATIENT
Start: 2023-05-10 | End: 2024-02-02

## 2023-05-10 NOTE — TELEPHONE ENCOUNTER
"Last Written Prescription Date:  8/17/22  Last Fill Quantity: 90,  # refills: 2   Last office visit provider:  3/31/23     Requested Prescriptions   Pending Prescriptions Disp Refills     glimepiride (AMARYL) 2 MG tablet [Pharmacy Med Name: GLIMEPIRIDE 2MG TABLETS] 180 tablet 1     Sig: TAKE 2 TABLETS(4 MG) BY MOUTH DAILY       Sulfonylurea Agents Passed - 5/9/2023  7:51 AM        Passed - Patient has documented A1c within the specified period of time.     If HgbA1C is 8 or greater, it needs to be on file within the past 3 months.  If less than 8, must be on file within the past 6 months.     Recent Labs   Lab Test 03/31/23  1539   A1C 7.3*             Passed - Medication is active on med list        Passed - Patient is age 18 or older        Passed - Patient has a recent creatinine (normal) within the past 12 mos.     Recent Labs   Lab Test 03/31/23  1539   CR 0.93       Ok to refill medication if creatinine is low          Passed - Recent (6 mo) or future (30 days) visit within the authorizing provider's specialty     Patient had office visit in the last 6 months or has a visit in the next 30 days with authorizing provider or within the authorizing provider's specialty.  See \"Patient Info\" tab in inbasket, or \"Choose Columns\" in Meds & Orders section of the refill encounter.               benazepril (LOTENSIN) 10 MG tablet [Pharmacy Med Name: BENAZEPRIL 10MG TABLETS] 90 tablet 0     Sig: TAKE 1 TABLET(10 MG) BY MOUTH DAILY       ACE Inhibitors (Including Combos) Protocol Passed - 5/9/2023  7:51 AM        Passed - Blood pressure under 140/90 in past 12 months     BP Readings from Last 3 Encounters:   03/31/23 132/63   04/26/22 132/62   11/29/21 134/62                 Passed - Recent (12 mo) or future (30 days) visit within the authorizing provider's specialty     Patient has had an office visit with the authorizing provider or a provider within the authorizing providers department within the previous 12 mos or has " "a future within next 30 days. See \"Patient Info\" tab in inbasket, or \"Choose Columns\" in Meds & Orders section of the refill encounter.              Passed - Medication is active on med list        Passed - Patient is age 18 or older        Passed - Normal serum creatinine on file in past 12 months     Recent Labs   Lab Test 03/31/23  1539   CR 0.93       Ok to refill medication if creatinine is low          Passed - Normal serum potassium on file in past 12 months     Recent Labs   Lab Test 03/31/23  1539   POTASSIUM 4.1                amLODIPine (NORVASC) 2.5 MG tablet [Pharmacy Med Name: AMLODIPINE BESYLATE 2.5MG TABLETS] 90 tablet 2     Sig: TAKE 1 TABLET(2.5 MG) BY MOUTH DAILY       Calcium Channel Blockers Protocol  Passed - 5/9/2023  7:51 AM        Passed - Blood pressure under 140/90 in past 12 months     BP Readings from Last 3 Encounters:   03/31/23 132/63   04/26/22 132/62   11/29/21 134/62                 Passed - Recent (12 mo) or future (30 days) visit within the authorizing provider's specialty     Patient has had an office visit with the authorizing provider or a provider within the authorizing providers department within the previous 12 mos or has a future within next 30 days. See \"Patient Info\" tab in inbasket, or \"Choose Columns\" in Meds & Orders section of the refill encounter.              Passed - Medication is active on med list        Passed - Patient is age 18 or older        Passed - Normal serum creatinine on file in past 12 months     Recent Labs   Lab Test 03/31/23  1539   CR 0.93       Ok to refill medication if creatinine is low               Krystle Dougherty, RN 05/10/23 9:47 AM  "

## 2023-08-08 DIAGNOSIS — E11.9 TYPE 2 DIABETES MELLITUS (H): ICD-10-CM

## 2023-08-08 RX ORDER — GLIMEPIRIDE 2 MG/1
TABLET ORAL
Qty: 180 TABLET | Refills: 1 | Status: SHIPPED | OUTPATIENT
Start: 2023-08-08 | End: 2024-02-14

## 2023-08-08 RX ORDER — GLIMEPIRIDE 2 MG/1
TABLET ORAL
Qty: 180 TABLET | Refills: 0 | Status: SHIPPED | OUTPATIENT
Start: 2023-08-08 | End: 2023-11-07

## 2023-08-08 NOTE — TELEPHONE ENCOUNTER
"Last Written Prescription Date:  8/8/23  Last Fill Quantity: 180,  # refills: 0   Last office visit provider:  3/31/23     Requested Prescriptions   Pending Prescriptions Disp Refills    glimepiride (AMARYL) 2 MG tablet [Pharmacy Med Name: GLIMEPIRIDE 2MG TABLETS] 180 tablet 0     Sig: TAKE 2 TABLETS(4 MG) BY MOUTH DAILY       Sulfonylurea Agents Passed - 8/8/2023  2:37 PM        Passed - Patient has documented A1c within the specified period of time.     If HgbA1C is 8 or greater, it needs to be on file within the past 3 months.  If less than 8, must be on file within the past 6 months.     Recent Labs   Lab Test 03/31/23  1539   A1C 7.3*             Passed - Medication is active on med list        Passed - Patient is age 18 or older        Passed - Patient has a recent creatinine (normal) within the past 12 mos.     Recent Labs   Lab Test 03/31/23  1539   CR 0.93       Ok to refill medication if creatinine is low          Passed - Recent (6 mo) or future (30 days) visit within the authorizing provider's specialty     Patient had office visit in the last 6 months or has a visit in the next 30 days with authorizing provider or within the authorizing provider's specialty.  See \"Patient Info\" tab in inbasket, or \"Choose Columns\" in Meds & Orders section of the refill encounter.                 Janiya Kirby 08/08/23 3:42 PM  "
no

## 2023-08-08 NOTE — TELEPHONE ENCOUNTER
Since Dr Mckeon is no longer seeing patients, Chinedu and spouse are thinking of establishing at a clinic closer to home, in the meantime while they decide where to switch to, Chinedu is almost out of requested medication

## 2023-08-08 NOTE — TELEPHONE ENCOUNTER
"  Last Written Prescription Date:  5/10/2023  Last Fill Quantity: 180,  # refills: 0   Last office visit provider:  3/31/2023     Requested Prescriptions   Pending Prescriptions Disp Refills    glimepiride (AMARYL) 2 MG tablet 180 tablet 0       Sulfonylurea Agents Passed - 8/8/2023  3:37 PM        Passed - Patient has documented A1c within the specified period of time.     If HgbA1C is 8 or greater, it needs to be on file within the past 3 months.  If less than 8, must be on file within the past 6 months.     Recent Labs   Lab Test 03/31/23  1539   A1C 7.3*             Passed - Medication is active on med list        Passed - Patient is age 18 or older        Passed - Patient has a recent creatinine (normal) within the past 12 mos.     Recent Labs   Lab Test 03/31/23  1539   CR 0.93       Ok to refill medication if creatinine is low          Passed - Recent (6 mo) or future (30 days) visit within the authorizing provider's specialty     Patient had office visit in the last 6 months or has a visit in the next 30 days with authorizing provider or within the authorizing provider's specialty.  See \"Patient Info\" tab in inbasket, or \"Choose Columns\" in Meds & Orders section of the refill encounter.                 Elise Austin RN 08/08/23 3:37 PM  "

## 2023-08-11 ENCOUNTER — TELEPHONE (OUTPATIENT)
Dept: FAMILY MEDICINE | Facility: CLINIC | Age: 78
End: 2023-08-11
Payer: MEDICARE

## 2023-08-11 NOTE — TELEPHONE ENCOUNTER
Reason for Call:  Appointment Request    Patient requesting this type of appt:  new pt consideration med check annual physicals     Requested provider:  Dr. Shaheed Tejeda     Reason patient unable to be scheduled:  not accepting new pts at the moment    When does patient want to be seen/preferred time:  Annual physical is due in March 2024 but also has med renewals due     Comments: pt was formally seen in [place] but Dr retired and they live closer to [place] and are looking for care closer to home. This would be for him and his wife     Could we send this information to you in [employer] or would you prefer to receive a phone call?:   Patient would prefer a phone call   Okay to leave a detailed message?: Yes at Cell number on file:    Telephone Information:   Mobile [phone]       Call taken on 8/11/2023 at 12:37 PM by France Chaparro

## 2023-08-11 NOTE — TELEPHONE ENCOUNTER
LVM for patient stating provider is not accepting new patients but would gladly schedule them with another provider in clinic who is accepting new patients.

## 2023-08-23 ENCOUNTER — TRANSFERRED RECORDS (OUTPATIENT)
Dept: HEALTH INFORMATION MANAGEMENT | Facility: CLINIC | Age: 78
End: 2023-08-23
Payer: MEDICARE

## 2023-10-15 ENCOUNTER — HEALTH MAINTENANCE LETTER (OUTPATIENT)
Age: 78
End: 2023-10-15

## 2023-11-07 ENCOUNTER — OFFICE VISIT (OUTPATIENT)
Dept: FAMILY MEDICINE | Facility: CLINIC | Age: 78
End: 2023-11-07
Payer: MEDICARE

## 2023-11-07 VITALS
BODY MASS INDEX: 22.81 KG/M2 | SYSTOLIC BLOOD PRESSURE: 148 MMHG | HEART RATE: 60 BPM | DIASTOLIC BLOOD PRESSURE: 72 MMHG | OXYGEN SATURATION: 100 % | WEIGHT: 154 LBS | RESPIRATION RATE: 16 BRPM | HEIGHT: 69 IN | TEMPERATURE: 97.9 F

## 2023-11-07 DIAGNOSIS — Z23 IMMUNIZATION DUE: ICD-10-CM

## 2023-11-07 DIAGNOSIS — Z29.11 NEED FOR VACCINATION AGAINST RESPIRATORY SYNCYTIAL VIRUS: ICD-10-CM

## 2023-11-07 DIAGNOSIS — E11.9 CONTROLLED TYPE 2 DIABETES MELLITUS WITHOUT COMPLICATION, WITHOUT LONG-TERM CURRENT USE OF INSULIN (H): Primary | ICD-10-CM

## 2023-11-07 DIAGNOSIS — E78.00 HYPERCHOLESTEREMIA: ICD-10-CM

## 2023-11-07 DIAGNOSIS — E55.9 VITAMIN D DEFICIENCY: ICD-10-CM

## 2023-11-07 DIAGNOSIS — N13.8 BENIGN PROSTATIC HYPERPLASIA WITH URINARY OBSTRUCTION: ICD-10-CM

## 2023-11-07 DIAGNOSIS — R97.20 ELEVATED PROSTATE SPECIFIC ANTIGEN (PSA): ICD-10-CM

## 2023-11-07 DIAGNOSIS — D12.6 ADENOMATOUS POLYP OF COLON, UNSPECIFIED PART OF COLON: ICD-10-CM

## 2023-11-07 DIAGNOSIS — I10 ESSENTIAL HYPERTENSION, BENIGN: ICD-10-CM

## 2023-11-07 DIAGNOSIS — N40.1 BENIGN PROSTATIC HYPERPLASIA WITH URINARY OBSTRUCTION: ICD-10-CM

## 2023-11-07 DIAGNOSIS — E11.9 TYPE 2 DIABETES MELLITUS WITHOUT COMPLICATION, WITHOUT LONG-TERM CURRENT USE OF INSULIN (H): ICD-10-CM

## 2023-11-07 PROBLEM — L57.0 ACTINIC KERATOSIS: Status: ACTIVE | Noted: 2023-11-07

## 2023-11-07 LAB
HBA1C MFR BLD: 8.1 % (ref 0–5.6)
HOLD SPECIMEN: NORMAL

## 2023-11-07 PROCEDURE — 99214 OFFICE O/P EST MOD 30 MIN: CPT | Performed by: FAMILY MEDICINE

## 2023-11-07 PROCEDURE — 83036 HEMOGLOBIN GLYCOSYLATED A1C: CPT | Performed by: FAMILY MEDICINE

## 2023-11-07 PROCEDURE — 36415 COLL VENOUS BLD VENIPUNCTURE: CPT | Performed by: FAMILY MEDICINE

## 2023-11-07 RX ORDER — LANCETS 28 GAUGE
EACH MISCELLANEOUS
Qty: 100 EACH | Refills: 4 | Status: SHIPPED | OUTPATIENT
Start: 2023-11-07 | End: 2024-05-14

## 2023-11-07 RX ORDER — GLIMEPIRIDE 2 MG/1
TABLET ORAL
Qty: 180 TABLET | Refills: 0 | Status: SHIPPED | OUTPATIENT
Start: 2023-11-07 | End: 2024-02-06

## 2023-11-07 RX ORDER — RESPIRATORY SYNCYTIAL VIRUS VACCINE 120MCG/0.5
0.5 KIT INTRAMUSCULAR ONCE
Qty: 1 EACH | Refills: 0 | Status: SHIPPED | OUTPATIENT
Start: 2023-11-07 | End: 2023-11-07

## 2023-11-07 NOTE — ASSESSMENT & PLAN NOTE
Uncontrolled htn, has been off his diet vacationing. Continue amlodipine 2.5 mg po q day and benazepril 10 mg po q day    HTN  Patient is asked to monitor BP at home or work, several times per month and return with written values at next office visit. Goal bp is 120/80. If staying higher than 130/85 on three occasions you should bring the values into clinic so that we can evaluate and treat if needed.    Recommend stop alcohol, caffiene, ibuprofen, carbonated drinks, sudafed & decrease salt intake. If you smoke, it is recommended that you quit. Keep weight in normal range.     Plan follow up blood pressure in 1-3 months.

## 2023-11-07 NOTE — PROGRESS NOTES
Assessment & Plan   Problem List Items Addressed This Visit          Digestive    Adenomatous polyp of colon, unspecified part of colon     He has had colonoscopy showing hx of polyps, and we discussed doing repeat colonoscopy now, he will contemplate, he is 78 so unsure.          Relevant Orders    Colonoscopy Screening  Referral    Vitamin D deficiency     Vit d normal 3/2023 no change in plan.            Endocrine    Controlled type 2 diabetes mellitus without complication, without long-term current use of insulin (H) - Primary     Diabetes is improving/stable/worsening: worsening.  Continue current treatment regimen.  Diabetes will be reassessed in 3 months.  He says diet was poor as he has been traveling, wants to improve diet and then follow up         Relevant Medications    glimepiride (AMARYL) 2 MG tablet    blood glucose monitoring (FREESTYLE) lancets    Other Relevant Orders    HEMOGLOBIN A1C (Completed)    Hypercholesteremia     Hyperlipidemia, continue lipitor 10 mg po q day.             Circulatory    Benign Essential Hypertension     Uncontrolled htn, has been off his diet vacationing. Continue amlodipine 2.5 mg po q day and benazepril 10 mg po q day    HTN  Patient is asked to monitor BP at home or work, several times per month and return with written values at next office visit. Goal bp is 120/80. If staying higher than 130/85 on three occasions you should bring the values into clinic so that we can evaluate and treat if needed.    Recommend stop alcohol, caffiene, ibuprofen, carbonated drinks, sudafed & decrease salt intake. If you smoke, it is recommended that you quit. Keep weight in normal range.     Plan follow up blood pressure in 1-3 months.             Urinary    Benign prostatic hyperplasia with urinary obstruction     Bph - managed by Dr. Hood. And he also manages his elevated prostate specific antigen. He is supposed to follow up 5/2025 with urology            Other     Serology Prostate-specific Antigen (PSA) Elevated     Bph - managed by Dr. Hood. And he also manages his elevated prostate specific antigen. He is supposed to follow up 5/2025 with urology          Other Visit Diagnoses       Need for vaccination against respiratory syncytial virus        Type 2 diabetes mellitus without complication, without long-term current use of insulin (H)        Relevant Medications    glimepiride (AMARYL) 2 MG tablet    Immunization due        Relevant Medications    respiratory syncytial virus vaccine, bivalent (ABRYSVO) injection             Valery Pagan MD  [facility] NARAYAN Che is a 78 year old, presenting for the following health issues:  Establish Care and Diabetes        11/7/2023    10:39 AM   Additional Questions   Roomed by as   Accompanied by wife         11/7/2023    10:39 AM   Patient Reported Additional Medications   Patient reports taking the following new medications no       History of Present Illness       Diabetes:   He presents for follow up of diabetes.  He is checking home blood glucose a few times a month.   He checks blood glucose before meals.  Blood glucose is never over 200 and never under 70. He is aware of hypoglycemia symptoms including none.    He has no concerns regarding his diabetes at this time.   He is not experiencing numbness or burning in feet, excessive thirst, blurry vision, weight changes or redness, sores or blisters on feet.           He eats 2-3 servings of fruits and vegetables daily.He consumes 0 sweetened beverage(s) daily.He exercises with enough effort to increase his heart rate 30 to 60 minutes per day.  He exercises with enough effort to increase his heart rate 6 days per week.   He is taking medications regularly.             Objective    BP (!) 148/72 (BP Location: Left arm, Patient Position: Left side, Cuff Size: Adult Regular)   Pulse 60   Temp 97.9  F (36.6  C) (Oral)   Resp 16   Ht 1.753  "m (5' 9\")   Wt 69.9 kg (154 lb)   SpO2 100%   BMI 22.74 kg/m    Body mass index is 22.74 kg/m .  Physical Exam  Constitutional:       Appearance: Normal appearance.   HENT:      Head: Normocephalic and atraumatic.   Cardiovascular:      Rate and Rhythm: Normal rate and regular rhythm.   Pulmonary:      Effort: Pulmonary effort is normal.   Musculoskeletal:         General: Normal range of motion.      Cervical back: Normal range of motion and neck supple.   Neurological:      General: No focal deficit present.      Mental Status: He is alert.                "

## 2023-11-07 NOTE — ASSESSMENT & PLAN NOTE
Diabetes is improving/stable/worsening: worsening.  Continue current treatment regimen.  Diabetes will be reassessed in 3 months.  He says diet was poor as he has been traveling, wants to improve diet and then follow up

## 2023-11-07 NOTE — ASSESSMENT & PLAN NOTE
He has had colonoscopy showing hx of polyps, and we discussed doing repeat colonoscopy now, he will contemplate, he is 78 so unsure.

## 2023-11-07 NOTE — ASSESSMENT & PLAN NOTE
Bph - managed by Dr. Hood. And he also manages his elevated prostate specific antigen. He is supposed to follow up 5/2025 with urology

## 2024-01-19 NOTE — PROGRESS NOTES
"Pt's wife Sheela returned call (active consent on file)   She states that they thought he was supposed to get labs to recheck his A1C because it was high during last visit.   &That he was supposed to follow-up with Dr. Pagan for A1C & B/P after the lab draw to discuss the results.   They had scheduled a lab appointment for 02/12/24 and then an OV with Dr. Pagan on 02/14/24 because they thought that's what they were supposed to do.    PCP note on lab A1C results of 8.1 on 11/07/23 states: \"  \"Discussed, follow up in 3 months\"   Written by Valery Pagan MD on 11/7/2023 12:32 PM CST    Please advise on request for A1C lab orders.     Pt would like a call back only if lab appointment needs to be cancelled.  "

## 2024-01-19 NOTE — PROGRESS NOTES
I cannot think of any labs he needs right now, I do think I wanted to see him back in clinic for abnormal blood pressure.  Maybe that is what he is thinking of?

## 2024-01-19 NOTE — PROGRESS NOTES
Message left with patient that lab appointment not needed.  A1C is not a fasting test and if needed can do here in clinic at his appointment.  Test results come back in 6 minutes.   [facility]

## 2024-01-19 NOTE — PROGRESS NOTES
Teri Pagan,      This patient is on [facility] schedule on 02/12/ and may need labs placed. Please advise / place orders.      Thanks ,    Kait

## 2024-01-19 NOTE — PROGRESS NOTES
"11/7/23 OV notes-   \"Diabetes will be reassessed in 3 months. Plan follow up blood pressure in 1-3 months.\"    Left message to call back for: pt  Information to relay to patient: visit may have been scheduled incorrectly, will more than likely need this changed over to clinic visit with Dr. Pagan       "

## 2024-02-02 DIAGNOSIS — I10 ESSENTIAL HYPERTENSION, BENIGN: ICD-10-CM

## 2024-02-02 RX ORDER — AMLODIPINE BESYLATE 2.5 MG/1
TABLET ORAL
Qty: 90 TABLET | Refills: 2 | Status: SHIPPED | OUTPATIENT
Start: 2024-02-02

## 2024-02-06 DIAGNOSIS — E11.9 TYPE 2 DIABETES MELLITUS WITHOUT COMPLICATION, WITHOUT LONG-TERM CURRENT USE OF INSULIN (H): ICD-10-CM

## 2024-02-06 DIAGNOSIS — I10 ESSENTIAL HYPERTENSION, BENIGN: ICD-10-CM

## 2024-02-06 RX ORDER — BENAZEPRIL HYDROCHLORIDE 10 MG/1
TABLET ORAL
Qty: 90 TABLET | Refills: 2 | Status: SHIPPED | OUTPATIENT
Start: 2024-02-06

## 2024-02-06 RX ORDER — GLIMEPIRIDE 2 MG/1
TABLET ORAL
Qty: 180 TABLET | Refills: 0 | Status: SHIPPED | OUTPATIENT
Start: 2024-02-06 | End: 2024-07-31

## 2024-02-14 ENCOUNTER — OFFICE VISIT (OUTPATIENT)
Dept: FAMILY MEDICINE | Facility: CLINIC | Age: 79
End: 2024-02-14
Payer: MEDICARE

## 2024-02-14 VITALS
WEIGHT: 153.6 LBS | TEMPERATURE: 97.4 F | DIASTOLIC BLOOD PRESSURE: 73 MMHG | RESPIRATION RATE: 16 BRPM | BODY MASS INDEX: 22.75 KG/M2 | OXYGEN SATURATION: 100 % | SYSTOLIC BLOOD PRESSURE: 159 MMHG | HEIGHT: 69 IN | HEART RATE: 62 BPM

## 2024-02-14 DIAGNOSIS — H61.23 EXCESSIVE CERUMEN IN BOTH EAR CANALS: ICD-10-CM

## 2024-02-14 DIAGNOSIS — Z00.00 HEALTH CARE MAINTENANCE: ICD-10-CM

## 2024-02-14 DIAGNOSIS — M77.12 LATERAL EPICONDYLITIS OF LEFT ELBOW: ICD-10-CM

## 2024-02-14 DIAGNOSIS — E11.9 CONTROLLED TYPE 2 DIABETES MELLITUS WITHOUT COMPLICATION, WITHOUT LONG-TERM CURRENT USE OF INSULIN (H): Primary | ICD-10-CM

## 2024-02-14 DIAGNOSIS — I10 ESSENTIAL HYPERTENSION, BENIGN: ICD-10-CM

## 2024-02-14 PROBLEM — N13.8 BENIGN PROSTATIC HYPERPLASIA WITH URINARY OBSTRUCTION: Status: ACTIVE | Noted: 2021-02-17

## 2024-02-14 PROBLEM — N40.1 BENIGN PROSTATIC HYPERPLASIA WITH URINARY OBSTRUCTION: Status: ACTIVE | Noted: 2021-02-17

## 2024-02-14 PROBLEM — R39.198 SLOWING OF URINARY STREAM: Status: ACTIVE | Noted: 2021-02-17

## 2024-02-14 PROBLEM — M77.10 TENNIS ELBOW: Status: ACTIVE | Noted: 2024-02-14

## 2024-02-14 PROBLEM — N52.01 ERECTILE DYSFUNCTION DUE TO ARTERIAL INSUFFICIENCY: Status: ACTIVE | Noted: 2017-09-21

## 2024-02-14 LAB
HBA1C MFR BLD: 7.4 % (ref 0–5.6)
HOLD SPECIMEN: NORMAL
HOLD SPECIMEN: NORMAL

## 2024-02-14 PROCEDURE — 36415 COLL VENOUS BLD VENIPUNCTURE: CPT | Performed by: FAMILY MEDICINE

## 2024-02-14 PROCEDURE — 99214 OFFICE O/P EST MOD 30 MIN: CPT | Performed by: FAMILY MEDICINE

## 2024-02-14 PROCEDURE — 83036 HEMOGLOBIN GLYCOSYLATED A1C: CPT | Performed by: FAMILY MEDICINE

## 2024-02-14 RX ORDER — TRIAMCINOLONE ACETONIDE 1 MG/G
CREAM TOPICAL 2 TIMES DAILY
COMMUNITY

## 2024-02-14 NOTE — ASSESSMENT & PLAN NOTE
Left tennis elbow - pathophysiology discussed see info in AVS. Tennis elbow strap recommended. He is about to leave town, recommend physical therapy when he returns to town if not improved

## 2024-02-14 NOTE — ASSESSMENT & PLAN NOTE
He requested that I write a letter for him stating that he is capable of driving when he goes [place].  I would like him to get an evaluation at the current center prior to writing this letter.  He agreed to these conditions and will follow-up with me if he gets the driving evaluation at the current center at which time I can review it and write the letter if appropriate.

## 2024-02-14 NOTE — PROGRESS NOTES
Assessment & Plan   Problem List Items Addressed This Visit          Nervous and Auditory    Excessive cerumen in both ear canals     Bilateral mild ceruminosis, lavage offered but declined as he doesn't have time to schedule (going out of town) will try mineral oil/ olive oil- see AVS.             Endocrine    Controlled type 2 diabetes mellitus without complication, without long-term current use of insulin (H) - Primary     Diabetes is improving/stable/worsening: improving with lifestyle modifications.  Continue current treatment regimen.  Diabetes will be reassessed in 3 months.         Relevant Orders    Hemoglobin A1c (Completed)       Circulatory    Benign Essential Hypertension     Blood pressure is 159/73 today could use improved control.  He says he did not take his morning blood pressure medications today.    Continue amlodipine 2.5 mg po q day and benazepril 10 mg po q day     HTN  Patient is asked to monitor BP at home or work, several times per month and return with written values at next office visit. Goal bp is 120/80. If staying higher than 130/85 on three occasions you should bring the values into clinic so that we can evaluate and treat if needed.    Recommend stop alcohol, caffiene, ibuprofen, carbonated drinks, sudafed & decrease salt intake. If you smoke, it is recommended that you quit. Keep weight in normal range.     FOLLOW UP IN 3 MONTH TO REASSESS BP WHEN HE IS ALSO DUE FOR DM FOLLOW UP and annual wellness visit.            Musculoskeletal and Integumentary    Tennis elbow     Left tennis elbow - pathophysiology discussed see info in AVS. Tennis elbow strap recommended. He is about to leave town, recommend physical therapy when he returns to town if not improved             Other    Health care maintenance     He requested that I write a letter for him stating that he is capable of driving when [place] goes [place].  I would like him to get an evaluation at the current center prior to writing  "this letter.  He agreed to these conditions and will follow-up with me if he gets the driving evaluation at the current center at which time I can review it and write the letter if appropriate.               Stepan Che is a 78 year old, presenting for the following health issues:  Follow Up (A1C and diabetes check, patient forgot to take all of his AM medications. )        2/14/2024     9:47 AM   Additional Questions   Roomed by Benton Klein MA   Accompanied by Self         2/14/2024     9:47 AM   Patient Reported Additional Medications   Patient reports taking the following new medications None     History of Present Illness       Diabetes:   He presents for follow up of diabetes.  He is checking home blood glucose a few times a month.   He checks blood glucose before meals.  Blood glucose is never over 200 and never under 70. He is aware of hypoglycemia symptoms including none.    He has no concerns regarding his diabetes at this time.   He is not experiencing numbness or burning in feet, excessive thirst, blurry vision, weight changes or redness, sores or blisters on feet.           Hypertension: He presents for follow up of hypertension.  He does check blood pressure  regularly outside of the clinic. Outpatient blood pressures have not been over 140/90. He follows a low salt diet.     He eats 4 or more servings of fruits and vegetables daily.He consumes 0 sweetened beverage(s) daily.He exercises with enough effort to increase his heart rate 60 or more minutes per day.  He exercises with enough effort to increase his heart rate 7 days per week.   He is taking medications regularly.                     Objective    BP (!) 159/73 (BP Location: Left arm, Patient Position: Sitting, Cuff Size: Adult Regular)   Pulse 62   Temp 97.4  F (36.3  C) (Oral)   Resp 16   Ht 1.753 m (5' 9\")   Wt 69.7 kg (153 lb 9.6 oz)   SpO2 100%   BMI 22.68 kg/m    Body mass index is 22.68 kg/m .  Physical Exam  Constitutional: "       Appearance: Normal appearance.   HENT:      Head: Normocephalic and atraumatic.      Right Ear: Tympanic membrane, ear canal and external ear normal. There is impacted cerumen (small cerumen seen bilaterally).      Left Ear: Tympanic membrane, ear canal and external ear normal. There is impacted cerumen (small cerumen seen bilaterally).   Cardiovascular:      Rate and Rhythm: Normal rate and regular rhythm.      Heart sounds: Normal heart sounds.   Pulmonary:      Effort: Pulmonary effort is normal.      Breath sounds: Normal breath sounds.   Musculoskeletal:         General: Normal range of motion.      Cervical back: Normal range of motion and neck supple.   Neurological:      General: No focal deficit present.      Mental Status: He is alert.        Diabetic foot exam: normal DP and PT pulses, no trophic changes or ulcerative lesions, and normal sensory exam           Signed Electronically by: Valery Pagan MD

## 2024-02-14 NOTE — ASSESSMENT & PLAN NOTE
Blood pressure is 159/73 today could use improved control.  He says he did not take his morning blood pressure medications today.    Continue amlodipine 2.5 mg po q day and benazepril 10 mg po q day     HTN  Patient is asked to monitor BP at home or work, several times per month and return with written values at next office visit. Goal bp is 120/80. If staying higher than 130/85 on three occasions you should bring the values into clinic so that we can evaluate and treat if needed.    Recommend stop alcohol, caffiene, ibuprofen, carbonated drinks, sudafed & decrease salt intake. If you smoke, it is recommended that you quit. Keep weight in normal range.     FOLLOW UP IN 3 MONTH TO REASSESS BP WHEN HE IS ALSO DUE FOR DM FOLLOW UP and annual wellness visit.

## 2024-02-14 NOTE — ASSESSMENT & PLAN NOTE
Bilateral mild ceruminosis, lavage offered but declined as he doesn't have time to schedule (going out of town) will try mineral oil/ olive oil- see AVS.

## 2024-02-14 NOTE — ASSESSMENT & PLAN NOTE
Diabetes is improving/stable/worsening: improving with lifestyle modifications.  Continue current treatment regimen.  Diabetes will be reassessed in 3 months.

## 2024-02-22 ENCOUNTER — TELEPHONE (OUTPATIENT)
Dept: FAMILY MEDICINE | Facility: CLINIC | Age: 79
End: 2024-02-22
Payer: MEDICARE

## 2024-02-22 NOTE — TELEPHONE ENCOUNTER
Patient Quality Outreach    Patient is due for the following:   Diabetes -  A1C, BP Check, and Diabetic Follow-Up Visit  Hypertension -  BP check and Hypertension follow-up visit    Next Steps:   Schedule a office visit for BP check and A1C follow up on 5/14/24.    Type of outreach:    Phone, spoke to patient/parent. Patient    Next Steps:  Reach out within 90 days via Phone.    Max number of attempts reached: No. Will try again in 90 days if patient still on fail list.    Questions for provider review:    None           Benton Klein MA  Chart routed to self.

## 2024-03-01 ENCOUNTER — PATIENT OUTREACH (OUTPATIENT)
Dept: CARE COORDINATION | Facility: CLINIC | Age: 79
End: 2024-03-01
Payer: MEDICARE

## 2024-03-15 ENCOUNTER — PATIENT OUTREACH (OUTPATIENT)
Dept: CARE COORDINATION | Facility: CLINIC | Age: 79
End: 2024-03-15
Payer: MEDICARE

## 2024-03-20 ENCOUNTER — MYC MEDICAL ADVICE (OUTPATIENT)
Dept: FAMILY MEDICINE | Facility: CLINIC | Age: 79
End: 2024-03-20
Payer: MEDICARE

## 2024-03-20 DIAGNOSIS — I10 ESSENTIAL HYPERTENSION, BENIGN: ICD-10-CM

## 2024-03-20 RX ORDER — ATORVASTATIN CALCIUM 10 MG/1
10 TABLET, FILM COATED ORAL DAILY
Qty: 90 TABLET | Refills: 2 | Status: SHIPPED | OUTPATIENT
Start: 2024-03-20

## 2024-04-24 ENCOUNTER — TRANSFERRED RECORDS (OUTPATIENT)
Dept: HEALTH INFORMATION MANAGEMENT | Facility: CLINIC | Age: 79
End: 2024-04-24
Payer: MEDICARE

## 2024-04-24 LAB — RETINOPATHY: NEGATIVE

## 2024-05-11 ENCOUNTER — HEALTH MAINTENANCE LETTER (OUTPATIENT)
Age: 79
End: 2024-05-11

## 2024-05-14 ENCOUNTER — OFFICE VISIT (OUTPATIENT)
Dept: FAMILY MEDICINE | Facility: CLINIC | Age: 79
End: 2024-05-14
Payer: MEDICARE

## 2024-05-14 VITALS
SYSTOLIC BLOOD PRESSURE: 150 MMHG | WEIGHT: 152 LBS | HEART RATE: 52 BPM | DIASTOLIC BLOOD PRESSURE: 71 MMHG | TEMPERATURE: 97.5 F | HEIGHT: 69 IN | RESPIRATION RATE: 12 BRPM | BODY MASS INDEX: 22.51 KG/M2 | OXYGEN SATURATION: 100 %

## 2024-05-14 DIAGNOSIS — Z00.00 HEALTH CARE MAINTENANCE: ICD-10-CM

## 2024-05-14 DIAGNOSIS — E11.9 CONTROLLED TYPE 2 DIABETES MELLITUS WITHOUT COMPLICATION, WITHOUT LONG-TERM CURRENT USE OF INSULIN (H): Primary | ICD-10-CM

## 2024-05-14 DIAGNOSIS — H91.93 DECREASED HEARING OF BOTH EARS: ICD-10-CM

## 2024-05-14 DIAGNOSIS — H61.23 BILATERAL IMPACTED CERUMEN: ICD-10-CM

## 2024-05-14 DIAGNOSIS — I10 ESSENTIAL HYPERTENSION, BENIGN: ICD-10-CM

## 2024-05-14 DIAGNOSIS — E78.00 HYPERCHOLESTEREMIA: ICD-10-CM

## 2024-05-14 PROBLEM — Z86.0100 HISTORY OF COLONIC POLYPS: Status: ACTIVE | Noted: 2018-11-02

## 2024-05-14 LAB
ALBUMIN SERPL BCG-MCNC: 4.7 G/DL (ref 3.5–5.2)
ALP SERPL-CCNC: 73 U/L (ref 40–150)
ALT SERPL W P-5'-P-CCNC: 23 U/L (ref 0–70)
ANION GAP SERPL CALCULATED.3IONS-SCNC: 10 MMOL/L (ref 7–15)
AST SERPL W P-5'-P-CCNC: 22 U/L (ref 0–45)
BILIRUB SERPL-MCNC: 1.3 MG/DL
BUN SERPL-MCNC: 17.4 MG/DL (ref 8–23)
CALCIUM SERPL-MCNC: 9.4 MG/DL (ref 8.8–10.2)
CHLORIDE SERPL-SCNC: 103 MMOL/L (ref 98–107)
CHOLEST SERPL-MCNC: 121 MG/DL
CREAT SERPL-MCNC: 1.06 MG/DL (ref 0.67–1.17)
CREAT UR-MCNC: 70.6 MG/DL
DEPRECATED HCO3 PLAS-SCNC: 26 MMOL/L (ref 22–29)
EGFRCR SERPLBLD CKD-EPI 2021: 71 ML/MIN/1.73M2
GLUCOSE SERPL-MCNC: 101 MG/DL (ref 70–99)
HBA1C MFR BLD: 7.1 % (ref 0–5.6)
HDLC SERPL-MCNC: 42 MG/DL
HOLD SPECIMEN: NORMAL
HOLD SPECIMEN: NORMAL
LDLC SERPL CALC-MCNC: 58 MG/DL
MICROALBUMIN UR-MCNC: <12 MG/L
MICROALBUMIN/CREAT UR: NORMAL MG/G{CREAT}
NONHDLC SERPL-MCNC: 79 MG/DL
POTASSIUM SERPL-SCNC: 4.6 MMOL/L (ref 3.4–5.3)
PROT SERPL-MCNC: 7 G/DL (ref 6.4–8.3)
SODIUM SERPL-SCNC: 139 MMOL/L (ref 135–145)
TRIGL SERPL-MCNC: 105 MG/DL

## 2024-05-14 PROCEDURE — 99214 OFFICE O/P EST MOD 30 MIN: CPT | Mod: 25 | Performed by: FAMILY MEDICINE

## 2024-05-14 PROCEDURE — 80061 LIPID PANEL: CPT | Performed by: FAMILY MEDICINE

## 2024-05-14 PROCEDURE — 80053 COMPREHEN METABOLIC PANEL: CPT | Performed by: FAMILY MEDICINE

## 2024-05-14 PROCEDURE — 83036 HEMOGLOBIN GLYCOSYLATED A1C: CPT | Performed by: FAMILY MEDICINE

## 2024-05-14 PROCEDURE — 82570 ASSAY OF URINE CREATININE: CPT | Performed by: FAMILY MEDICINE

## 2024-05-14 PROCEDURE — 82043 UR ALBUMIN QUANTITATIVE: CPT | Performed by: FAMILY MEDICINE

## 2024-05-14 PROCEDURE — 69209 REMOVE IMPACTED EAR WAX UNI: CPT | Mod: 50 | Performed by: FAMILY MEDICINE

## 2024-05-14 PROCEDURE — 36415 COLL VENOUS BLD VENIPUNCTURE: CPT | Performed by: FAMILY MEDICINE

## 2024-05-14 RX ORDER — GLUCOSAMINE HCL/CHONDROITIN SU 500-400 MG
1 CAPSULE ORAL DAILY
Qty: 100 STRIP | Refills: 3 | Status: SHIPPED | OUTPATIENT
Start: 2024-05-14

## 2024-05-14 RX ORDER — LANCETS 28 GAUGE
EACH MISCELLANEOUS
Qty: 100 EACH | Refills: 4 | Status: SHIPPED | OUTPATIENT
Start: 2024-05-14

## 2024-05-14 NOTE — PROGRESS NOTES
Assessment & Plan   Problem List Items Addressed This Visit          Nervous and Auditory    Bilateral impacted cerumen     Bilateral cerumen lavaged today.  He tolerated this well.         Relevant Orders    [place] REMOVAL IMPACTED CERUMEN IRRIGATION/LVG UNILAT (Completed)    OK REMOVAL IMPACTED CERUMEN IRRIGATION/LVG UNILAT (Completed)    Decreased hearing of both ears     Patient reports diminished hearing, cerumen impaction noted bilaterally today.  Ears were lavaged.  Audiology referral is placed         Relevant Orders    Adult Audiology  Referral       Endocrine    Controlled type 2 diabetes mellitus without complication, without long-term current use of insulin (H) - Primary     Diabetes is stable - A1c 7.1  Continue current treatment regimen.  Continue amaryl 2.5mg po q day  Continue benapril 10mg po qo q day  Lipitor 10 mg po q day  Diabetic education ordered  Diabetic supplies refilled  Eye exam normal 4/24/2024  Covid vaccine offered.   Diabetes will be reassessed in 3 months.            Relevant Medications    blood glucose monitoring (NO BRAND SPECIFIED) meter device kit    blood glucose monitoring (FREESTYLE) lancets    blood glucose monitoring (NO BRAND SPECIFIED) meter device kit    Glucose Blood (BLOOD GLUCOSE TEST STRIPS) STRP    Other Relevant Orders    Hemoglobin A1c (Completed)    Albumin Random Urine Quantitative with Creat Ratio    Adult Diabetes Education  Referral    Hypercholesteremia     Hyperlipidemia-recheck lipids today.  Continue Lipitor 10 mg p.o. daily         Relevant Orders    Lipid Profile       Circulatory    Benign Essential Hypertension     Hypertension-blood pressure remains elevated systolically however diastolic blood pressure is normal to low.  At this time we will continue BenzePrO 10 mg p.o. daily and amlodipine 2.5 mg p.o. daily to make any changes as I am concerned that he will become lightheaded if his blood pressure is still too low.  He did bring in  "home blood pressures which range 121-147/60-71 and he had several readings with him         Relevant Orders    Comprehensive metabolic panel (BMP + Alb, Alk Phos, ALT, AST, Total. Bili, TP)       Other    Health care maintenance     Health care maintenance  Covid vaccine recommended, annual wellness due.              Stepan Che is a 78 year old, presenting for the following health issues:  Diabetes and Blood Pressure Check    History of Present Illness       Diabetes:   He presents for follow up of diabetes.  He is checking home blood glucose a few times a month.   He checks blood glucose before meals.  Blood glucose is never over 200 and never under 70.  When his blood glucose is low, the patient is asymptomatic for confusion, blurred vision, lethargy and reports not feeling dizzy, shaky, or weak.   He has no concerns regarding his diabetes at this time.   He is not experiencing numbness or burning in feet, excessive thirst, blurry vision, weight changes or redness, sores or blisters on feet.           He eats 2-3 servings of fruits and vegetables daily.He consumes 0 sweetened beverage(s) daily.He exercises with enough effort to increase his heart rate 30 to 60 minutes per day.  He exercises with enough effort to increase his heart rate 7 days per week.   He is taking medications regularly.           Objective    BP (!) 150/71 (BP Location: Left arm, Patient Position: Left side, Cuff Size: Adult Large)   Pulse 52   Temp 97.5  F (36.4  C) (Oral)   Resp 12   Ht 1.753 m (5' 9\")   Wt 68.9 kg (152 lb)   SpO2 100%   BMI 22.45 kg/m    Body mass index is 22.45 kg/m .  Physical Exam  Constitutional:       Appearance: Normal appearance.   HENT:      Head: Normocephalic and atraumatic.      Right Ear: There is impacted cerumen (there is some impacted cerumen).      Left Ear: There is impacted cerumen (there is some impacted cerumen).   Cardiovascular:      Rate and Rhythm: Normal rate and regular rhythm.      " Heart sounds: Normal heart sounds.   Pulmonary:      Effort: Pulmonary effort is normal.      Breath sounds: Normal breath sounds.   Abdominal:      General: Bowel sounds are normal.      Palpations: Abdomen is soft.   Musculoskeletal:         General: Normal range of motion.      Cervical back: Normal range of motion and neck supple.   Neurological:      General: No focal deficit present.      Mental Status: He is alert.              Signed Electronically by: Valery Pagan MD

## 2024-05-14 NOTE — ASSESSMENT & PLAN NOTE
Hypertension-blood pressure remains elevated systolically however diastolic blood pressure is normal to low.  At this time we will continue BenzePrO 10 mg p.o. daily and amlodipine 2.5 mg p.o. daily to make any changes as I am concerned that he will become lightheaded if his blood pressure is still too low.  He did bring in home blood pressures which range 121-147/60-71 and he had several readings with him

## 2024-05-14 NOTE — ASSESSMENT & PLAN NOTE
Patient reports diminished hearing, cerumen impaction noted bilaterally today.  Ears were lavaged.  Audiology referral is placed

## 2024-05-14 NOTE — ASSESSMENT & PLAN NOTE
Diabetes is stable - A1c 7.1  Continue current treatment regimen.  Continue amaryl 2.5mg po q day  Continue benapril 10mg po qo q day  Lipitor 10 mg po q day  Diabetic education ordered  Diabetic supplies refilled  Eye exam normal 4/24/2024  Covid vaccine offered.   Diabetes will be reassessed in 3 months.

## 2024-07-31 DIAGNOSIS — E11.9 TYPE 2 DIABETES MELLITUS WITHOUT COMPLICATION, WITHOUT LONG-TERM CURRENT USE OF INSULIN (H): ICD-10-CM

## 2024-07-31 RX ORDER — GLIMEPIRIDE 2 MG/1
TABLET ORAL
Qty: 180 TABLET | Refills: 0 | Status: SHIPPED | OUTPATIENT
Start: 2024-07-31

## 2024-08-02 ENCOUNTER — PATIENT OUTREACH (OUTPATIENT)
Dept: CARE COORDINATION | Facility: CLINIC | Age: 79
End: 2024-08-02
Payer: MEDICARE

## 2024-09-04 ENCOUNTER — TRANSFERRED RECORDS (OUTPATIENT)
Dept: HEALTH INFORMATION MANAGEMENT | Facility: CLINIC | Age: 79
End: 2024-09-04
Payer: MEDICARE

## 2024-09-27 ENCOUNTER — PATIENT OUTREACH (OUTPATIENT)
Dept: CARE COORDINATION | Facility: CLINIC | Age: 79
End: 2024-09-27
Payer: MEDICARE

## 2024-10-08 NOTE — TELEPHONE ENCOUNTER
RN cannot approve Refill Request    RN can NOT refill this medication med is not covered by policy/route to provider. Last office visit: 9/21/2020 Ivanna Grey PA-C Last Physical: Visit date not found Last MTM visit: Visit date not found Last visit same specialty: 9/21/2020 Ivanna Grey PA-C.  Next visit within 3 mo: Visit date not found  Next physical within 3 mo: Visit date not found      Janiya Kirby, [facility] Triage/Med Refill 9/27/2020    Requested Prescriptions   Pending Prescriptions Disp Refills     ciprofloxacin-dexamethasone (CIPRODEX) otic suspension [Pharmacy Med Name: CIPRO/DEXAMETH 0.3-0.1% OTIC SUSP] 7.5 mL 0     Sig: SHAKE LIQUID AND INSTILL 4 DROPS IN AFFECTED EAR(S) TWICE DAILY FOR 7 DAYS       There is no refill protocol information for this order           
 used

## 2024-10-16 ENCOUNTER — OFFICE VISIT (OUTPATIENT)
Dept: FAMILY MEDICINE | Facility: CLINIC | Age: 79
End: 2024-10-16
Payer: MEDICARE

## 2024-10-16 VITALS
BODY MASS INDEX: 22.22 KG/M2 | TEMPERATURE: 98.4 F | HEIGHT: 69 IN | HEART RATE: 67 BPM | DIASTOLIC BLOOD PRESSURE: 71 MMHG | RESPIRATION RATE: 16 BRPM | SYSTOLIC BLOOD PRESSURE: 143 MMHG | WEIGHT: 150 LBS | OXYGEN SATURATION: 100 %

## 2024-10-16 DIAGNOSIS — Z86.0100 HISTORY OF COLONIC POLYPS: ICD-10-CM

## 2024-10-16 DIAGNOSIS — R97.20 HIGH PROSTATE SPECIFIC ANTIGEN (PSA): ICD-10-CM

## 2024-10-16 DIAGNOSIS — E11.9 CONTROLLED TYPE 2 DIABETES MELLITUS WITHOUT COMPLICATION, WITHOUT LONG-TERM CURRENT USE OF INSULIN (H): Primary | ICD-10-CM

## 2024-10-16 DIAGNOSIS — E11.9 TYPE 2 DIABETES MELLITUS WITHOUT COMPLICATION, WITHOUT LONG-TERM CURRENT USE OF INSULIN (H): ICD-10-CM

## 2024-10-16 DIAGNOSIS — Z00.00 HEALTH CARE MAINTENANCE: ICD-10-CM

## 2024-10-16 DIAGNOSIS — H61.23 CERUMINOSIS, BILATERAL: ICD-10-CM

## 2024-10-16 DIAGNOSIS — Z51.81 ENCOUNTER FOR THERAPEUTIC DRUG MONITORING: ICD-10-CM

## 2024-10-16 DIAGNOSIS — I10 ESSENTIAL HYPERTENSION, BENIGN: ICD-10-CM

## 2024-10-16 DIAGNOSIS — E78.00 HYPERCHOLESTEREMIA: ICD-10-CM

## 2024-10-16 DIAGNOSIS — E55.9 VITAMIN D DEFICIENCY: ICD-10-CM

## 2024-10-16 DIAGNOSIS — M40.00 KYPHOSIS (ACQUIRED) (POSTURAL): ICD-10-CM

## 2024-10-16 LAB
EST. AVERAGE GLUCOSE BLD GHB EST-MCNC: 148 MG/DL
HBA1C MFR BLD: 6.8 % (ref 0–5.6)
HOLD SPECIMEN: NORMAL
HOLD SPECIMEN: NORMAL

## 2024-10-16 PROCEDURE — 36415 COLL VENOUS BLD VENIPUNCTURE: CPT | Performed by: FAMILY MEDICINE

## 2024-10-16 PROCEDURE — 69209 REMOVE IMPACTED EAR WAX UNI: CPT | Mod: 50 | Performed by: FAMILY MEDICINE

## 2024-10-16 PROCEDURE — 99214 OFFICE O/P EST MOD 30 MIN: CPT | Mod: 25 | Performed by: FAMILY MEDICINE

## 2024-10-16 PROCEDURE — G0439 PPPS, SUBSEQ VISIT: HCPCS | Performed by: FAMILY MEDICINE

## 2024-10-16 PROCEDURE — 83036 HEMOGLOBIN GLYCOSYLATED A1C: CPT | Performed by: FAMILY MEDICINE

## 2024-10-16 RX ORDER — KETOCONAZOLE 20 MG/G
CREAM TOPICAL DAILY
COMMUNITY
Start: 2024-09-05

## 2024-10-16 RX ORDER — GLIMEPIRIDE 2 MG/1
TABLET ORAL
Qty: 180 TABLET | Refills: 1 | Status: SHIPPED | OUTPATIENT
Start: 2024-10-16

## 2024-10-16 RX ORDER — AMMONIUM LACTATE 12 G/100G
LOTION TOPICAL DAILY PRN
COMMUNITY
Start: 2024-09-05

## 2024-10-16 RX ORDER — AMLODIPINE BESYLATE 2.5 MG/1
2.5 TABLET ORAL DAILY
Qty: 90 TABLET | Refills: 2 | Status: SHIPPED | OUTPATIENT
Start: 2024-10-16

## 2024-10-16 RX ORDER — BENAZEPRIL HYDROCHLORIDE 10 MG/1
10 TABLET ORAL DAILY
Qty: 90 TABLET | Refills: 3 | Status: SHIPPED | OUTPATIENT
Start: 2024-10-16

## 2024-10-16 RX ORDER — ATORVASTATIN CALCIUM 10 MG/1
10 TABLET, FILM COATED ORAL DAILY
Qty: 90 TABLET | Refills: 3 | Status: SHIPPED | OUTPATIENT
Start: 2024-10-16

## 2024-10-16 RX ORDER — BETAMETHASONE DIPROPIONATE 0.5 MG/G
CREAM TOPICAL 2 TIMES DAILY
COMMUNITY
Start: 2024-09-05

## 2024-10-16 SDOH — HEALTH STABILITY: PHYSICAL HEALTH: ON AVERAGE, HOW MANY DAYS PER WEEK DO YOU ENGAGE IN MODERATE TO STRENUOUS EXERCISE (LIKE A BRISK WALK)?: 6 DAYS

## 2024-10-16 ASSESSMENT — SOCIAL DETERMINANTS OF HEALTH (SDOH): HOW OFTEN DO YOU GET TOGETHER WITH FRIENDS OR RELATIVES?: ONCE A WEEK

## 2024-10-16 NOTE — PROGRESS NOTES
Prevent[facility] Care Visit  [facility]  Valery Pagan MD, Family Medicine  Oct 16, 2024      Problem List Items Addressed This Visit          Digestive    Vitamin D deficiency     Vit d deficiency-will recheck vitamin D level and titrate supplement as needed.         Relevant Orders    Vitamin D Deficiency       Endocrine    Controlled type 2 diabetes mellitus without complication, without long-term current use of insulin (H) - Primary     Diabetes is controlled with A1c 6.8  Diabetes is stable - A1c pending, but  he reports home blood sugars run .   Continue current treatment regimen.  Continue amaryl 2.5mg po q day  Continue benapril 10mg po qo q day  Lipitor 10 mg po q day  Diabetic education ordered but he declines to schedule.   Eye exam normal 4/24/2024  Diabetes will be reassessed in 3 months.           Relevant Medications    glimepiride (AMARYL) 2 MG tablet    Other Relevant Orders    Hemoglobin A1c (Completed)    Extra Tube    Hypercholesteremia     Hyperlipidemia - continue lipitor 10 mg po q day. Repeat labs to monitor.         Relevant Medications    atorvastatin (LIPITOR) 10 MG tablet    Other Relevant Orders    Lipid Profile       Circulatory    Benign Essential Hypertension     Hypertension-blood pressure remains elevated systolically however diastolic blood pressure is normal to low.  At this time we will continue Benzepril 10 mg p.o. daily and amlodipine 2.5 mg p.o. daily to make any changes as I am concerned that he will become lightheaded if his blood pressure is still too low.  He reports home bp readings are in normal range.             Relevant Medications    amLODIPine (NORVASC) 2.5 MG tablet    atorvastatin (LIPITOR) 10 MG tablet    benazepril (LOTENSIN) 10 MG tablet       Musculoskeletal and Integumentary    Kyphosis (acquired) (postural)     Kyphosis, physical therapy ordered.          Relevant Orders    Physical Therapy  Referral       Other    High  prostate specific antigen (PSA)     Bph - managed by Dr. Hood. And he also manages his elevated prostate specific antigen. He is supposed to follow up 5/2025 with urology          Health care maintenance     Vaccines: up to date.    Recommend sunscreen, exercise, & healthy diet.  Offered cbc, cmp, lipids and asked what other testing he desires today  I have had an Advance Directives discussion with the patient.   Body mass index is 22.15 kg/m .   mychart offered.           History of colonic polyps     Repeat colonoscopy discussed. Last done in 2018. Pros and cons of repeat colonoscopy discussed.           Other Visit Diagnoses       Encounter for therapeutic drug monitoring        Relevant Orders    CBC with platelets    Comprehensive metabolic panel (BMP + Alb, Alk Phos, ALT, AST, Total. Bili, TP)    Ceruminosis, bilateral        Relevant Orders    [place] REMOVAL IMPACTED CERUMEN IRRIGATION/LVG UNILAT (Completed)    [place] REMOVAL IMPACTED CERUMEN IRRIGATION/LVG UNILAT (Completed)    Type 2 diabetes mellitus without complication, without long-term current use of insulin (H)        Relevant Medications    glimepiride (AMARYL) 2 MG tablet               Stepan Che is a 79 year old, presenting for the following:  Physical        10/16/2024     9:56 AM   Additional Questions   Roomed by ac   Accompanied by self       Health Care Directive  Patient has a Health Care Directive on file  Advance care planning document is on file and is current.          10/16/2024   General Health   How would you rate your overall physical health? Good   Feel stress (tense, anxious, or unable to sleep) Not at all            10/16/2024   Nutrition   Diet: Diabetic            10/16/2024   Exercise   Days per week of moderate/strenous exercise 6 days            10/16/2024   Social Factors   Frequency of gathering with friends or relatives Once a week   Worry food won't last until get money to buy more No   Food not last or not have enough  money for food? No   Do you have housing? (Housing is defined as stable permanent housing and does not include staying ouside in a car, in a tent, in an abandoned building, in an overnight shelter, or couch-surfing.) Yes   Are you worried about losing your housing? No   Lack of transportation? No   Unable to get utilities (heat,electricity)? No            10/16/2024   Fall Risk   Fallen 2 or more times in the past year? No   Trouble with walking or balance? No             10/16/2024   Activities of Daily Living- Home Safety   Needs help with the following daily activites None of the above   Safety concerns in the home None of the above            10/16/2024   Dental   Dentist two times every year? Yes            10/16/2024   Hearing Screening   Hearing concerns? (!) I NEED TO ASK PEOPLE TO SPEAK UP OR REPEAT THEMSELVES. - ear lavage today and hearing test soon            10/16/2024   Driving Risk Screening   Patient/family members have concerns about driving No            10/16/2024   General Alertness/Fatigue Screening   Have you been more tired than usual lately? No            10/16/2024   Urinary Incontinence Screening   Bothered by leaking urine in past 6 months No            10/16/2024   TB Screening   Were you born outside of the [place]? No            Today's PHQ-2 Score:       10/16/2024     9:17 AM   PHQ-2 ( 1999 [employer])   Q1: Little interest or pleasure in doing things 0   Q2: Feeling down, depressed or hopeless 0   PHQ-2 Score 0   Q1: Little interest or pleasure in doing things Not at all   Q2: Feeling down, depressed or hopeless Not at all   PHQ-2 Score 0           10/16/2024   Substance Use   Alcohol more than 3/day or more than 7/wk No   Do you have a current opioid prescription? No   How severe/bad is pain from 1 to 10? 0/10 (No Pain)   Do you use any other substances recreationally? No        Social History     Tobacco Use    Smoking status: Never    Smokeless tobacco: Never   Vaping Use    Vaping status:  "Never Used   Substance Use Topics    Alcohol use: Yes     Alcohol/week: 7.0 standard drinks of alcohol    Drug use: No       Reviewed and updated as needed this visit by Provider   Tobacco  Allergies  Meds  Problems  Med Hx  Surg Hx  Fam Hx              Current providers sharing in care for this patient include:  Patient Care Team:  Valery Pagan MD as PCP - General (Family Medicine)  Valery Pagan MD as Assigned PCP  Saundra Ding AuD as Audiologist (Audiology)    The following health maintenance items are reviewed in Epic and correct as of today:  Health Maintenance   Topic Date Due    A1C  04/16/2025    EYE EXAM  04/24/2025    BMP  05/14/2025    LIPID  05/14/2025    MICROALBUMIN  05/14/2025    ANNUAL REVIEW OF HM ORDERS  05/14/2025    MEDICARE ANNUAL WELLNESS VISIT  10/16/2025    DIABETIC FOOT EXAM  10/16/2025    FALL RISK ASSESSMENT  10/16/2025    ADVANCE CARE PLANNING  10/16/2029    DTAP/TDAP/TD IMMUNIZATION (3 - Td or Tdap) 03/22/2031    PHQ-2 (once per calendar year)  Completed    INFLUENZA VACCINE  Completed    Pneumococcal Vaccine: 65+ Years  Completed    ZOSTER IMMUNIZATION  Completed    RSV VACCINE  Completed    COVID-19 Vaccine  Completed    HPV IMMUNIZATION  Aged Out    MENINGITIS IMMUNIZATION  Aged Out    RSV MONOCLONAL ANTIBODY  Aged Out          Objective    Exam  BP (!) 143/71 (BP Location: Left arm, Patient Position: Sitting, Cuff Size: Adult Regular)   Pulse 67   Temp 98.4  F (36.9  C) (Oral)   Resp 16   Ht 1.753 m (5' 9\")   Wt 68 kg (150 lb)   SpO2 100%   BMI 22.15 kg/m     Estimated body mass index is 22.15 kg/m  as calculated from the following:    Height as of this encounter: 1.753 m (5' 9\").    Weight as of this encounter: 68 kg (150 lb).    Physical Exam  Constitutional:       Appearance: Normal appearance.   HENT:      Head: Normocephalic and atraumatic.      Right Ear: External ear normal. There is impacted cerumen.      Left Ear: External ear normal. " There is impacted cerumen.      Nose: Nose normal.      Mouth/Throat:      Mouth: Mucous membranes are moist.      Pharynx: Oropharynx is clear.   Eyes:      Conjunctiva/sclera: Conjunctivae normal.      Pupils: Pupils are equal, round, and reactive to light.   Cardiovascular:      Rate and Rhythm: Normal rate and regular rhythm.      Heart sounds: Normal heart sounds.   Pulmonary:      Effort: Pulmonary effort is normal.      Breath sounds: Normal breath sounds.   Abdominal:      General: Bowel sounds are normal.      Palpations: Abdomen is soft.   Musculoskeletal:         General: Normal range of motion.      Cervical back: Normal range of motion and neck supple.      Comments: Kyphotic posture noted.   Skin:     General: Skin is warm and dry.   Neurological:      General: No focal deficit present.      Mental Status: He is alert.   Psychiatric:         Mood and Affect: Mood normal.         Behavior: Behavior normal.         Thought Content: Thought content normal.         Judgment: Judgment normal.     Diabetic foot exam: normal DP and PT pulses, no trophic changes or ulcerative lesions, and normal sensory exam     Cerumenosis is noted bilaterally.  Wax is removed by syringing and manual debridement. Instructions for home care to prevent wax buildup are given.         10/16/2024   Mini Cog   Clock Draw Score 2 Normal   3 Item Recall 3 objects recalled   Mini Cog Total Score 5               Signed Electronically by: Valery Pagan MD

## 2024-10-16 NOTE — ASSESSMENT & PLAN NOTE
Diabetes is controlled with A1c 6.8  Diabetes is stable - A1c pending, but  he reports home blood sugars run .   Continue current treatment regimen.  Continue amaryl 2.5mg po q day  Continue benapril 10mg po qo q day  Lipitor 10 mg po q day  Diabetic education ordered but he declines to schedule.   Eye exam normal 4/24/2024  Diabetes will be reassessed in 3 months.

## 2024-10-16 NOTE — ASSESSMENT & PLAN NOTE
Hypertension-blood pressure remains elevated systolically however diastolic blood pressure is normal to low.  At this time we will continue Benzepril 10 mg p.o. daily and amlodipine 2.5 mg p.o. daily to make any changes as I am concerned that he will become lightheaded if his blood pressure is still too low.  He reports home bp readings are in normal range.

## 2024-10-16 NOTE — ASSESSMENT & PLAN NOTE
Vaccines: up to date.    Recommend sunscreen, exercise, & healthy diet.  Offered cbc, cmp, lipids and asked what other testing he desires today  I have had an Advance Directives discussion with the patient.   Body mass index is 22.15 kg/m .   mychart offered.

## 2024-10-30 ENCOUNTER — OFFICE VISIT (OUTPATIENT)
Dept: AUDIOLOGY | Facility: CLINIC | Age: 79
End: 2024-10-30
Attending: FAMILY MEDICINE
Payer: MEDICARE

## 2024-10-30 DIAGNOSIS — H91.93 DECREASED HEARING OF BOTH EARS: ICD-10-CM

## 2024-10-30 DIAGNOSIS — H90.3 SENSORINEURAL HEARING LOSS (SNHL) OF BOTH EARS: Primary | ICD-10-CM

## 2024-10-30 PROCEDURE — 92557 COMPREHENSIVE HEARING TEST: CPT | Performed by: AUDIOLOGIST

## 2024-10-30 PROCEDURE — 92567 TYMPANOMETRY: CPT | Performed by: AUDIOLOGIST

## 2024-10-30 NOTE — PROGRESS NOTES
AUDIOLOGY REPORT    SUBJECTIVE:  Chinedu Cifuentes is a 79 year old male who was seen in the Audiology Clinic at the [facility] for audiologic evaluation, referred by Valery Pagan MD.    Patient last audiogram in 2020 showed normal sloping to moderate sensorineural hearing loss bilaterally. Today, patient reports feeling like his hearing has gotten worse. He reports intermittent tinnitus in both ears, but more rare. He reports his daughter has had severe hearing loss since childhood. He reports some noise exposure in the past going to loud rock concerts. He denies otalgia, otorrhea, aural fullness, dizziness, past ear surgery, and prior use of amplification.     OBJECTIVE:  Abuse Screening:  Do you feel unsafe at home or work/school? No  Do you feel threatened by someone? No  Does anyone try to keep you from having contact with others, or doing things outside of your home? No  Physical signs of abuse present? No     Fall Risk Screen:  1. Have you fallen two or more times in the past year? No  2. Have you fallen and had an injury in the past year? No    Timed Up and Go Score (in seconds): not tested  Is patient a fall risk? No  Referral initiated: No  Fall Risk Assessment Completed by Audiology    Otoscopic exam indicates ears are clear of cerumen bilaterally     Pure Tone Thresholds assessed using conventional audiometry with good  reliability from 250-8000 Hz bilaterally using insert earphones and circumaural headphones     RIGHT:  normal sloping to moderate sensorineural hearing loss    LEFT:  normal sloping to moderate sensorineural hearing loss    Tympanogram:    RIGHT: normal eardrum mobility    LEFT:   normal eardrum mobility    Reflexes (reported by stimulus ear):   Could not maintain seal due to excess hair in ear canals      Speech Reception Threshold:    RIGHT: 15 dB HL    LEFT:   20 dB HL  Word Recognition Score:     RIGHT: 100% at 60 dB HL using NU-6 recorded word list.     LEFT:   100% at 60 dB HL using NU-6 recorded word list.      ASSESSMENT:     Audiogram showed normal sloping to moderate sensorineural hearing loss in both ears. Today s results were discussed with the patient in detail.     PLAN:   It is recommended that the patient follow up with Audiology if interested in trial with amplification.  Return in 1-2 years to monitor hearing. Utilize hearing protection when around hazardous levels of noise. Please call this clinic with questions regarding these results or recommendations.      Viviane Rizo., CCC-A  [place] Licensed Audiologist #[id number]

## 2024-11-19 ENCOUNTER — THERAPY VISIT (OUTPATIENT)
Dept: PHYSICAL THERAPY | Facility: REHABILITATION | Age: 79
End: 2024-11-19
Attending: FAMILY MEDICINE
Payer: MEDICARE

## 2024-11-19 DIAGNOSIS — M40.00 KYPHOSIS (ACQUIRED) (POSTURAL): ICD-10-CM

## 2024-11-19 NOTE — PROGRESS NOTES
PHYSICAL THERAPY EVALUATION  Type of Visit: Evaluation        Fall Risk Screen:  Fall screen completed by: PT  Have you fallen 2 or more times in the past year?: (Patient-Rptd) No  Have you fallen and had an injury in the past year?: (Patient-Rptd) No  Is patient a fall risk?: No    Subjective         Presenting condition or subjective complaint:    Pt reports that he has been having a curvature of his back most of his life but it is getting worse.   Pt reports he walks most days as able for about 1 hour and in the winter he walks inside.  Pt is concerned that he might continue to lose height and get more limited with his function and would like to learn exercises to keep his posture as upright as possible.    Date of onset: 10/16/24    Relevant medical history: (Patient-Rptd) Diabetes; Hearing problems; High blood pressure   Dates & types of surgery: (Patient-Rptd) gall bladder    Prior diagnostic imaging/testing results: (Patient-Rptd) MRI; CT scan; X-ray     Prior therapy history for the same diagnosis, illness or injury:        Prior Level of Function  Transfers: Independent  Ambulation: Independent  ADL: Independent      Living Environment  Social support: (Patient-Rptd) With a significant other or spouse   Type of home: (Patient-Rptd) Apartment/condo   Stairs to enter the home:         Ramp: (Patient-Rptd) Yes   Stairs inside the home: (Patient-Rptd) No       Help at home: (Patient-Rptd) None  Equipment owned:       Employment: (Patient-Rptd) No    Hobbies/Interests:      Patient goals for therapy:      Pain assessment:  No pain complaints today     Objective   CERVICAL SPINE EVALUATION    POSTURE:  Excessive kyphosis  GAIT:   Weightbearing Status: WBAT  Assistive Device(s): None  Gait Deviations:  B trendelenberg  BALANCE/PROPRIOCEPTION:  10 reps for APTA sit to  30 seconds <11 at risk for falls    ROM:  Cervical flex min limited without pain, ext mod limited with min pain end range, B rotation min  limited without pain,  B SB mod limited without pain  Thoracic rotation min limited without pain B  Thoracic extension major limited with pt on being able to reach neutral.  Lumbar B SB major limited except if pt goes into flexion can reach down to calf - without pain   MYOTOMES:  B UE grossly 5/5 except B ER 4+/5    DERMATOMES:  B UE intact to light touch    FLEXIBILITY:  Tightness B pecs, sub-occipitals        Assessment & Plan   CLINICAL IMPRESSIONS  Medical Diagnosis: Acquired kyphosis    Treatment Diagnosis: Acquired kyphosis   Impression/Assessment: Patient is a 79 year old male with abnormal posture complaints.  The following significant findings have been identified: Decreased ROM/flexibility, Decreased joint mobility, Decreased strength, Impaired muscle performance, Decreased activity tolerance, and Impaired posture. These impairments interfere with their ability to perform self care tasks, recreational activities, and community mobility as compared to previous level of function.     Clinical Decision Making (Complexity):  Clinical Presentation: Stable/Uncomplicated  Clinical Presentation Rationale: based on medical and personal factors listed in PT evaluation  Clinical Decision Making (Complexity): Low complexity    PLAN OF CARE  Treatment Interventions:  Interventions: Therapeutic Exercise    Long Term Goals     PT Goal 1  Goal Description: Pt will be knowledgeable and be able to perform in HEP for spinal and postural strength without pain in 1 visit.  Target Date: 11/19/24  Date Met: 11/19/24      Frequency of Treatment: 1 visit  Duration of Treatment: 1    Risks and benefits of evaluation/treatment have been explained.   Patient/Family/caregiver agrees with Plan of Care.     Evaluation Time:     PT Eval, Low Complexity Minutes (69170): 16  Evaluation Only     Signing Clinician: Leesa Mulligan PT        [facility]                                                                                    OUTPATIENT PHYSICAL THERAPY      PLAN OF TREATMENT FOR OUTPATIENT REHABILITATION   Patient's Last Name, First Name, Chinedu Bro YOB: 1945   Provider's Name   [facility]   Medical Record No.  [id number]     Onset Date: 10/16/24  Start of Care Date: 11/19/24     Medical Diagnosis:  Acquired kyphosis      PT Treatment Diagnosis:  Acquired kyphosis Plan of Treatment  Frequency/Duration: 1 visit/ 1    Certification date from 11/21/24 to 11/21/24         See note for plan of treatment details and functional goals     Leesa Mulligan PT                         I CERTIFY THE NEED FOR THESE SERVICES FURNISHED UNDER        THIS PLAN OF TREATMENT AND WHILE UNDER MY CARE     (Physician attestation of this document indicates review and certification of the therapy plan).              Referring Provider:  Valery Pagan MD    Initial Assessment  See Epic Evaluation- Start of Care Date: 11/19/24

## 2024-11-19 NOTE — PATIENT INSTRUCTIONS
"\"Motion is lotion\"    \"You can't go wrong - getting strong\"    Home exercise program    Sit to stand    X10-15 reps in a row for 1-2 rounds 1-2x/day as able    Band exercises      Pull back and hold x3 seconds x10-15 reps 1-2 sets 1-2x/day      Hold band out in front of you and then stretch both sides out to the side x3 seconds x10-15 reps 1-2 sets 1-2x/day    CHIN TUCK EXERCISE (3 versions - do whichever feels good)    Laying with a pillow x5 seconds x10 reps        Sitting - can do into headrest of car - so you have feedback of how hard you are pushing x5 seconds x10 reps        Standing against a wall and push back with a pillow behind your head x5 seconds x10 reps    CALL Leesa if any questions [phone]            "

## 2024-12-01 ENCOUNTER — TRANSFERRED RECORDS (OUTPATIENT)
Dept: MULTI SPECIALTY CLINIC | Facility: CLINIC | Age: 79
End: 2024-12-01

## 2024-12-01 LAB — RETINOPATHY: NORMAL

## 2025-02-25 ENCOUNTER — OFFICE VISIT (OUTPATIENT)
Dept: FAMILY MEDICINE | Facility: CLINIC | Age: 80
End: 2025-02-25
Attending: FAMILY MEDICINE
Payer: MEDICARE

## 2025-02-25 VITALS
SYSTOLIC BLOOD PRESSURE: 123 MMHG | WEIGHT: 150 LBS | HEART RATE: 62 BPM | DIASTOLIC BLOOD PRESSURE: 62 MMHG | RESPIRATION RATE: 14 BRPM | BODY MASS INDEX: 22.22 KG/M2 | TEMPERATURE: 97.6 F | OXYGEN SATURATION: 100 % | HEIGHT: 69 IN

## 2025-02-25 DIAGNOSIS — R97.20 HIGH PROSTATE SPECIFIC ANTIGEN (PSA): ICD-10-CM

## 2025-02-25 DIAGNOSIS — E11.65 UNCONTROLLED TYPE 2 DIABETES MELLITUS WITH HYPERGLYCEMIA (H): Primary | ICD-10-CM

## 2025-02-25 DIAGNOSIS — I10 ESSENTIAL HYPERTENSION, BENIGN: ICD-10-CM

## 2025-02-25 DIAGNOSIS — Z51.81 ENCOUNTER FOR THERAPEUTIC DRUG MONITORING: ICD-10-CM

## 2025-02-25 DIAGNOSIS — E78.00 HYPERCHOLESTEREMIA: ICD-10-CM

## 2025-02-25 DIAGNOSIS — E55.9 VITAMIN D DEFICIENCY: ICD-10-CM

## 2025-02-25 LAB
ALBUMIN SERPL BCG-MCNC: 4.5 G/DL (ref 3.5–5.2)
ALP SERPL-CCNC: 92 U/L (ref 40–150)
ALT SERPL W P-5'-P-CCNC: 34 U/L (ref 0–70)
ANION GAP SERPL CALCULATED.3IONS-SCNC: 12 MMOL/L (ref 7–15)
AST SERPL W P-5'-P-CCNC: 30 U/L (ref 0–45)
BILIRUB SERPL-MCNC: 0.9 MG/DL
BUN SERPL-MCNC: 17.7 MG/DL (ref 8–23)
CALCIUM SERPL-MCNC: 9.9 MG/DL (ref 8.8–10.4)
CHLORIDE SERPL-SCNC: 102 MMOL/L (ref 98–107)
CHOLEST SERPL-MCNC: 116 MG/DL
CREAT SERPL-MCNC: 1.2 MG/DL (ref 0.67–1.17)
EGFRCR SERPLBLD CKD-EPI 2021: 61 ML/MIN/1.73M2
ERYTHROCYTE [DISTWIDTH] IN BLOOD BY AUTOMATED COUNT: 12.7 % (ref 10–15)
EST. AVERAGE GLUCOSE BLD GHB EST-MCNC: 192 MG/DL
FASTING STATUS PATIENT QL REPORTED: ABNORMAL
FASTING STATUS PATIENT QL REPORTED: ABNORMAL
GLUCOSE SERPL-MCNC: 179 MG/DL (ref 70–99)
HBA1C MFR BLD: 8.3 % (ref 0–5.6)
HCO3 SERPL-SCNC: 25 MMOL/L (ref 22–29)
HCT VFR BLD AUTO: 40.4 % (ref 40–53)
HDLC SERPL-MCNC: 25 MG/DL
HGB BLD-MCNC: 13.5 G/DL (ref 13.3–17.7)
HOLD SPECIMEN: NORMAL
LDLC SERPL CALC-MCNC: 65 MG/DL
MCH RBC QN AUTO: 30 PG (ref 26.5–33)
MCHC RBC AUTO-ENTMCNC: 33.4 G/DL (ref 31.5–36.5)
MCV RBC AUTO: 90 FL (ref 78–100)
NONHDLC SERPL-MCNC: 91 MG/DL
PLATELET # BLD AUTO: 184 10E3/UL (ref 150–450)
POTASSIUM SERPL-SCNC: 5.2 MMOL/L (ref 3.4–5.3)
PROT SERPL-MCNC: 7.5 G/DL (ref 6.4–8.3)
RBC # BLD AUTO: 4.5 10E6/UL (ref 4.4–5.9)
SODIUM SERPL-SCNC: 139 MMOL/L (ref 135–145)
TRIGL SERPL-MCNC: 129 MG/DL
VIT D+METAB SERPL-MCNC: 35 NG/ML (ref 20–50)
WBC # BLD AUTO: 7.4 10E3/UL (ref 4–11)

## 2025-02-25 PROCEDURE — 85027 COMPLETE CBC AUTOMATED: CPT | Performed by: FAMILY MEDICINE

## 2025-02-25 PROCEDURE — 82306 VITAMIN D 25 HYDROXY: CPT | Performed by: FAMILY MEDICINE

## 2025-02-25 PROCEDURE — 80053 COMPREHEN METABOLIC PANEL: CPT | Performed by: FAMILY MEDICINE

## 2025-02-25 PROCEDURE — 83036 HEMOGLOBIN GLYCOSYLATED A1C: CPT | Performed by: FAMILY MEDICINE

## 2025-02-25 PROCEDURE — 99214 OFFICE O/P EST MOD 30 MIN: CPT | Performed by: FAMILY MEDICINE

## 2025-02-25 PROCEDURE — 3078F DIAST BP <80 MM HG: CPT | Performed by: FAMILY MEDICINE

## 2025-02-25 PROCEDURE — 3074F SYST BP LT 130 MM HG: CPT | Performed by: FAMILY MEDICINE

## 2025-02-25 PROCEDURE — 80061 LIPID PANEL: CPT | Performed by: FAMILY MEDICINE

## 2025-02-25 PROCEDURE — 36415 COLL VENOUS BLD VENIPUNCTURE: CPT | Performed by: FAMILY MEDICINE

## 2025-02-25 PROCEDURE — G2211 COMPLEX E/M VISIT ADD ON: HCPCS | Performed by: FAMILY MEDICINE

## 2025-02-25 RX ORDER — ATORVASTATIN CALCIUM 10 MG/1
10 TABLET, FILM COATED ORAL DAILY
Qty: 90 TABLET | Refills: 3 | Status: SHIPPED | OUTPATIENT
Start: 2025-02-25

## 2025-02-25 NOTE — ASSESSMENT & PLAN NOTE
Uncontrolled diabetes II. A1c Worsening from 6.8 to 8.3. - he thinks this maybe got aggravated due to change in diet as he was traveling most recently.   Currently taking amaryl 4 mg po q day, benapril 10 mg po q day, lipitor 10 mg po q day  Dm ed has been recommended but he has declined in the past, again recommended and ordered.   Eye exam is normal as of 4/24/2024 (due for annual soon) - informed  Recommend [facility] consult to see what we can add to improve control that is affordable for him.   Follow up in 3 months to recheck diabetes    Orders:    Med Therapy Management Referral    Adult Diabetes Education  Referral; Future    Adult Eye  Referral; Future

## 2025-02-25 NOTE — ASSESSMENT & PLAN NOTE
Hyperlipidemia - continue lipitor 10 mg po q day. Will add on lipid to his blood today.     Orders:    atorvastatin (LIPITOR) 10 MG tablet; Take 1 tablet (10 mg) by mouth daily.    Lipid Profile

## 2025-02-25 NOTE — PROGRESS NOTES
Assessment & Plan  Uncontrolled type 2 diabetes mellitus with hyperglycemia (H)  Uncontrolled diabetes II. A1c Worsening from 6.8 to 8.3. - he thinks this maybe got aggravated due to change in diet as he was traveling most recently.   Currently taking amaryl 4 mg po q day, benapril 10 mg po q day, lipitor 10 mg po q day  Dm ed has been recommended but he has declined in the past, again recommended and ordered.   Eye exam is normal as of 4/24/2024 (due for annual soon) - informed  Recommend [facility] pharmacy consult to see what we can add to improve control that is affordable for him.   Follow up in 3 months to recheck diabetes    Orders:    Med Therapy Management Referral    Adult Diabetes Education  Referral; Future    Adult Eye  Referral; Future    Encounter for therapeutic drug monitoring    Orders:    CBC with platelets    Comprehensive metabolic panel (BMP + Alb, Alk Phos, ALT, AST, Total. Bili, TP)    Hypercholesteremia  Hyperlipidemia - continue lipitor 10 mg po q day. Will add on lipid to his blood today.     Orders:    atorvastatin (LIPITOR) 10 MG tablet; Take 1 tablet (10 mg) by mouth daily.    Lipid Profile    Vitamin D deficiency    Orders:    Vitamin D Deficiency    Benign Essential Hypertension  Bp well controlled.   continue Benzepril 10 mg p.o. daily and amlodipine 2.5 mg p.o. daily         High prostate specific antigen (PSA)  Bph - managed by Dr. Hood. And he also manages his elevated prostate specific antigen. He is supposed to follow up 5/2025 with urology                   Subjective   Chinedu is a 79 year old, presenting for the following health issues:  Diabetes    History of Present Illness       Diabetes:   He presents for follow up of diabetes.  He is checking home blood glucose a few times a month.   He checks blood glucose before meals.  Blood glucose is never over 200 and never under 70.  When his blood glucose is low, the patient is asymptomatic for confusion, blurred  "vision, lethargy and reports not feeling dizzy, shaky, or weak.   He has no concerns regarding his diabetes at this time.   He is not experiencing numbness or burning in feet, excessive thirst, blurry vision, weight changes or redness, sores or blisters on feet.                   Objective    /62 (BP Location: Left arm, Patient Position: Left side, Cuff Size: Adult Large)   Pulse 62   Temp 97.6  F (36.4  C) (Oral)   Resp 14   Ht 1.753 m (5' 9\")   Wt 68 kg (150 lb)   SpO2 100%   BMI 22.15 kg/m    Body mass index is 22.15 kg/m .  Physical Exam               Signed Electronically by: Valery Pagan MD    "

## 2025-03-11 ENCOUNTER — TRANSFERRED RECORDS (OUTPATIENT)
Dept: HEALTH INFORMATION MANAGEMENT | Facility: CLINIC | Age: 80
End: 2025-03-11
Payer: MEDICARE

## 2025-04-30 DIAGNOSIS — E11.9 TYPE 2 DIABETES MELLITUS WITHOUT COMPLICATION, WITHOUT LONG-TERM CURRENT USE OF INSULIN (H): ICD-10-CM

## 2025-04-30 RX ORDER — GLIMEPIRIDE 2 MG/1
TABLET ORAL
Qty: 180 TABLET | Refills: 1 | Status: SHIPPED | OUTPATIENT
Start: 2025-04-30

## 2025-05-14 ENCOUNTER — TRANSFERRED RECORDS (OUTPATIENT)
Dept: HEALTH INFORMATION MANAGEMENT | Facility: CLINIC | Age: 80
End: 2025-05-14
Payer: MEDICARE

## 2025-05-21 ENCOUNTER — TRANSFERRED RECORDS (OUTPATIENT)
Dept: HEALTH INFORMATION MANAGEMENT | Facility: CLINIC | Age: 80
End: 2025-05-21
Payer: MEDICARE

## 2025-05-27 ENCOUNTER — RESULTS FOLLOW-UP (OUTPATIENT)
Dept: FAMILY MEDICINE | Facility: CLINIC | Age: 80
End: 2025-05-27

## 2025-05-27 ENCOUNTER — OFFICE VISIT (OUTPATIENT)
Dept: FAMILY MEDICINE | Facility: CLINIC | Age: 80
End: 2025-05-27
Attending: FAMILY MEDICINE
Payer: MEDICARE

## 2025-05-27 VITALS
DIASTOLIC BLOOD PRESSURE: 62 MMHG | HEART RATE: 55 BPM | BODY MASS INDEX: 22.82 KG/M2 | TEMPERATURE: 98.2 F | OXYGEN SATURATION: 100 % | RESPIRATION RATE: 14 BRPM | SYSTOLIC BLOOD PRESSURE: 134 MMHG | HEIGHT: 69 IN | WEIGHT: 154.1 LBS

## 2025-05-27 DIAGNOSIS — I10 ESSENTIAL HYPERTENSION, BENIGN: ICD-10-CM

## 2025-05-27 DIAGNOSIS — E78.00 HYPERCHOLESTEREMIA: ICD-10-CM

## 2025-05-27 DIAGNOSIS — H61.22 IMPACTED CERUMEN OF LEFT EAR: ICD-10-CM

## 2025-05-27 DIAGNOSIS — E11.9 TYPE 2 DIABETES, HBA1C GOAL < 8% (H): Primary | ICD-10-CM

## 2025-05-27 DIAGNOSIS — B37.2 CANDIDIASIS OF SKIN: ICD-10-CM

## 2025-05-27 LAB
CREAT UR-MCNC: 56.1 MG/DL
EST. AVERAGE GLUCOSE BLD GHB EST-MCNC: 163 MG/DL
HBA1C MFR BLD: 7.3 % (ref 0–5.6)
HOLD SPECIMEN: NORMAL
MICROALBUMIN UR-MCNC: <12 MG/L
MICROALBUMIN/CREAT UR: NORMAL MG/G{CREAT}

## 2025-05-27 PROCEDURE — 82043 UR ALBUMIN QUANTITATIVE: CPT | Performed by: FAMILY MEDICINE

## 2025-05-27 PROCEDURE — 69209 REMOVE IMPACTED EAR WAX UNI: CPT | Performed by: FAMILY MEDICINE

## 2025-05-27 PROCEDURE — 3051F HG A1C>EQUAL 7.0%<8.0%: CPT | Performed by: FAMILY MEDICINE

## 2025-05-27 PROCEDURE — 3078F DIAST BP <80 MM HG: CPT | Performed by: FAMILY MEDICINE

## 2025-05-27 PROCEDURE — 83036 HEMOGLOBIN GLYCOSYLATED A1C: CPT | Performed by: FAMILY MEDICINE

## 2025-05-27 PROCEDURE — 99214 OFFICE O/P EST MOD 30 MIN: CPT | Mod: 25 | Performed by: FAMILY MEDICINE

## 2025-05-27 PROCEDURE — 3075F SYST BP GE 130 - 139MM HG: CPT | Performed by: FAMILY MEDICINE

## 2025-05-27 PROCEDURE — 36415 COLL VENOUS BLD VENIPUNCTURE: CPT | Performed by: FAMILY MEDICINE

## 2025-05-27 PROCEDURE — 82570 ASSAY OF URINE CREATININE: CPT | Performed by: FAMILY MEDICINE

## 2025-05-27 RX ORDER — FLUCONAZOLE 150 MG/1
150 TABLET ORAL
Qty: 3 TABLET | Refills: 0 | Status: SHIPPED | OUTPATIENT
Start: 2025-05-27 | End: 2025-06-03

## 2025-05-27 NOTE — ASSESSMENT & PLAN NOTE
Improved diabetes II. A1c now at goal 7.3.  No change in plan. Currently taking amaryl 4 mg po q day, benapril 10 mg po q day, lipitor 10 mg po q day - no change.   Dm ed - again discussed. He thinks he did this but I cannot see notes from dm ed in chart. He will look into this  Eye exam is normal as of 4/24/2024 (due for annual soon) - informed   Recommend [facility] pharmacy consult to see what we can add to improve control that is affordable for him.   Follow up in 3 months to recheck diabetes

## 2025-05-27 NOTE — ASSESSMENT & PLAN NOTE
He has diffuse tinea versicolor and tinea pedis noted.   Diflucan orally x 3 days     Orders:    fluconazole (DIFLUCAN) 150 MG tablet; Take 1 tablet (150 mg) by mouth every 3 days for 3 doses.

## 2025-05-27 NOTE — ASSESSMENT & PLAN NOTE
Left cerumenosis noted today, he requests ear lavage. Order placed  Resolved after left ear lavage in usual manner which was tolerated well.     Orders:    MT REMOVAL IMPACTED CERUMEN IRRIGATION/LVG UNILAT

## 2025-05-27 NOTE — PROGRESS NOTES
Assessment & Plan  Type 2 diabetes, HbA1C goal < 8% (H)  Improved diabetes II. A1c now at goal 7.3.  No change in plan. Currently taking amaryl 4 mg po q day, benapril 10 mg po q day, lipitor 10 mg po q day - no change.   Dm ed - again discussed. He thinks he did this but I cannot see notes from dm ed in chart. He will look into this  Eye exam is normal as of 4/24/2024 (due for annual soon) - informed   Recommend [facility] pharmacy consult to see what we can add to improve control that is affordable for him.   Follow up in 3 months to recheck diabetes       Benign Essential Hypertension  Bp well controlled.   continue Benzepril 10 mg p.o. daily and amlodipine 2.5 mg p.o. daily       Hypercholesteremia  Hyperlipidemia - continue lipitor 10 mg po q day. Will add on lipid to his blood today.        Impacted cerumen of left ear  Left cerumenosis noted today, he requests ear lavage. Order placed  Resolved after left ear lavage in usual manner which was tolerated well.     Orders:    [place] REMOVAL IMPACTED CERUMEN IRRIGATION/LVG UNILAT    Candidiasis of skin  He has diffuse tinea versicolor and tinea pedis noted.   Diflucan orally x 3 days     Orders:    fluconazole (DIFLUCAN) 150 MG tablet; Take 1 tablet (150 mg) by mouth every 3 days for 3 doses.           Stepan Che is a 79 year old, presenting for the following health issues:  Diabetes        5/27/2025    10:11 AM   Additional Questions   Roomed by ac   Accompanied by self     Via the Health Maintenance questionnaire, the patient has reported the following services have been completed -Eye Exam: [facility] 2024-12-01, this information has been sent to the abstraction team.  History of Present Illness       Diabetes:   He presents for follow up of diabetes.  He is checking home blood glucose a few times a week.   He checks blood glucose before meals.  Blood glucose is never over 200 and never under 70.  When his blood glucose is low, the patient is asymptomatic for  "confusion, blurred vision, lethargy and reports not feeling dizzy, shaky, or weak.   He has no concerns regarding his diabetes at this time.  He is having redness, sores, or blisters on feet.  The patient has had a diabetic eye exam in the last 12 months. Eye exam performed on december. Location of last eye exam [facility] eye [facility].                 Objective    /62 (BP Location: Left arm, Patient Position: Sitting, Cuff Size: Adult Regular)   Pulse 55   Temp 98.2  F (36.8  C) (Oral)   Resp 14   Ht 1.753 m (5' 9\")   Wt 69.9 kg (154 lb 1.6 oz)   SpO2 100%   BMI 22.76 kg/m    Body mass index is 22.76 kg/m .  Physical Exam  Constitutional:       Appearance: Normal appearance.   HENT:      Head: Normocephalic and atraumatic.      Right Ear: Tympanic membrane, ear canal and external ear normal. There is no impacted cerumen.      Left Ear: There is impacted cerumen.   Cardiovascular:      Rate and Rhythm: Normal rate and regular rhythm.      Heart sounds: Normal heart sounds.   Pulmonary:      Effort: Pulmonary effort is normal.   Abdominal:      General: Bowel sounds are normal.      Palpations: Abdomen is soft.   Musculoskeletal:         General: Normal range of motion.      Cervical back: Normal range of motion and neck supple.   Skin:     Comments: Scaly macules noted over torso and legs and he has peeling dry, flaking skin noted on both feet.    Neurological:      General: No focal deficit present.      Mental Status: He is alert.     Diabetic foot exam: normal DP and PT pulses, normal sensory exam, tinea pedis, and onychomycosis           Signed Electronically by: Valery Pagan MD    "

## 2025-07-14 ENCOUNTER — TRANSFERRED RECORDS (OUTPATIENT)
Dept: HEALTH INFORMATION MANAGEMENT | Facility: CLINIC | Age: 80
End: 2025-07-14
Payer: MEDICARE

## 2025-09-03 ENCOUNTER — OFFICE VISIT (OUTPATIENT)
Dept: FAMILY MEDICINE | Facility: CLINIC | Age: 80
End: 2025-09-03
Payer: MEDICARE

## 2025-09-03 VITALS
SYSTOLIC BLOOD PRESSURE: 146 MMHG | RESPIRATION RATE: 16 BRPM | DIASTOLIC BLOOD PRESSURE: 68 MMHG | HEART RATE: 56 BPM | HEIGHT: 69 IN | WEIGHT: 156 LBS | BODY MASS INDEX: 23.11 KG/M2 | TEMPERATURE: 97.7 F | OXYGEN SATURATION: 100 %

## 2025-09-03 DIAGNOSIS — E78.00 HYPERCHOLESTEREMIA: ICD-10-CM

## 2025-09-03 DIAGNOSIS — E11.9 TYPE 2 DIABETES, HBA1C GOAL < 8% (H): Primary | ICD-10-CM

## 2025-09-03 DIAGNOSIS — I10 ESSENTIAL HYPERTENSION, BENIGN: ICD-10-CM

## 2025-09-03 LAB
EST. AVERAGE GLUCOSE BLD GHB EST-MCNC: 163 MG/DL
HBA1C MFR BLD: 7.3 % (ref 0–5.6)
HOLD SPECIMEN: NORMAL
HOLD SPECIMEN: NORMAL

## 2025-09-03 PROCEDURE — 36415 COLL VENOUS BLD VENIPUNCTURE: CPT | Performed by: FAMILY MEDICINE

## 2025-09-03 PROCEDURE — 3078F DIAST BP <80 MM HG: CPT | Performed by: FAMILY MEDICINE

## 2025-09-03 PROCEDURE — 99214 OFFICE O/P EST MOD 30 MIN: CPT | Performed by: FAMILY MEDICINE

## 2025-09-03 PROCEDURE — 3077F SYST BP >= 140 MM HG: CPT | Performed by: FAMILY MEDICINE

## 2025-09-03 PROCEDURE — 83036 HEMOGLOBIN GLYCOSYLATED A1C: CPT | Performed by: FAMILY MEDICINE

## 2025-09-03 PROCEDURE — 3051F HG A1C>EQUAL 7.0%<8.0%: CPT | Performed by: FAMILY MEDICINE

## 2025-09-03 RX ORDER — GLUCOSAMINE HCL/CHONDROITIN SU 500-400 MG
1 CAPSULE ORAL DAILY
Qty: 100 STRIP | Refills: 3 | Status: CANCELLED | OUTPATIENT
Start: 2025-09-03

## 2025-09-03 RX ORDER — GLIPIZIDE 2.5 MG/1
2.5 TABLET, EXTENDED RELEASE ORAL DAILY
Qty: 90 TABLET | Refills: 1 | Status: SHIPPED | OUTPATIENT
Start: 2025-09-03

## 2025-09-03 RX ORDER — TRIAMCINOLONE ACETONIDE 1 MG/G
CREAM TOPICAL 2 TIMES DAILY
Status: CANCELLED | OUTPATIENT
Start: 2025-09-03